# Patient Record
Sex: MALE | Race: OTHER | NOT HISPANIC OR LATINO | URBAN - METROPOLITAN AREA
[De-identification: names, ages, dates, MRNs, and addresses within clinical notes are randomized per-mention and may not be internally consistent; named-entity substitution may affect disease eponyms.]

---

## 2017-03-13 ENCOUNTER — EMERGENCY (EMERGENCY)
Facility: HOSPITAL | Age: 32
LOS: 1 days | Discharge: HOME | End: 2017-03-13
Attending: EMERGENCY MEDICINE | Admitting: EMERGENCY MEDICINE
Payer: COMMERCIAL

## 2017-03-13 VITALS
HEART RATE: 89 BPM | TEMPERATURE: 99 F | SYSTOLIC BLOOD PRESSURE: 133 MMHG | RESPIRATION RATE: 18 BRPM | DIASTOLIC BLOOD PRESSURE: 80 MMHG | OXYGEN SATURATION: 97 %

## 2017-03-13 VITALS
WEIGHT: 220.02 LBS | OXYGEN SATURATION: 97 % | DIASTOLIC BLOOD PRESSURE: 89 MMHG | HEIGHT: 66 IN | SYSTOLIC BLOOD PRESSURE: 140 MMHG | HEART RATE: 112 BPM | RESPIRATION RATE: 97 BRPM | TEMPERATURE: 99 F

## 2017-03-13 DIAGNOSIS — J02.9 ACUTE PHARYNGITIS, UNSPECIFIED: ICD-10-CM

## 2017-03-13 PROCEDURE — 99283 EMERGENCY DEPT VISIT LOW MDM: CPT

## 2017-03-13 RX ORDER — DEXAMETHASONE 0.5 MG/5ML
10 ELIXIR ORAL ONCE
Qty: 0 | Refills: 0 | Status: COMPLETED | OUTPATIENT
Start: 2017-03-13 | End: 2017-03-13

## 2017-03-13 RX ADMIN — Medication 300 MILLIGRAM(S): at 14:59

## 2017-03-13 RX ADMIN — Medication 10 MILLIGRAM(S): at 14:59

## 2017-03-13 NOTE — ED PROVIDER NOTE - CARE PLAN
Principal Discharge DX:	Uvulitis Principal Discharge DX:	Uvulitis  Instructions for follow-up, activity and diet:	Take clindamycin (antibiotic) as prescribed. Cold drinks for comfort. Follow-up with your primary doctor within 1-2 days. Return to an ER for any concerns.

## 2017-03-13 NOTE — ED ADULT NURSE NOTE - OBJECTIVE STATEMENT
32 y/o male  seen in [facility] c/o enlarged uvula since yesterday. Pt feels discomfort due to enlarged tonsil. No fever, cough, rash/hives, SOB.  No acute respiratory distress noted.

## 2017-03-13 NOTE — ED PROVIDER NOTE - MEDICAL DECISION MAKING DETAILS
31yM uvulitis without tonsillitis. Muffled voice but tolerating PO. Unlikely allergic. Likely viral but will treat with clindamycin and decadron, dc with close outpatient follow-up. Roxanne PGY3 31yM uvulitis without tonsillitis. Muffled voice but tolerating PO. Unlikely allergic. Likely viral but will treat with clindamycin and decadron, dc with close outpatient follow-up. Roxanne PGY3  Attending Statement: Agree with the above.  Uvulitis.  Protecting airway.  NSAIDs/decadron provided.  Clinda Rx'd.  ENT fu prn.  Strict return precautions provided.  --BMM

## 2017-03-13 NOTE — ED PROVIDER NOTE - OBJECTIVE STATEMENT
31yM no PMH, no known allergies presents with enlarged uvula since yesterday. Was out drinking rhe night before at a wedding and woke up with the enlarged uvula. Feels discomfort due to enlarged tonsil. No fever, cough, rash/hives, SOB. 31yM no PMH, no known allergies presents with enlarged uvula and sore throat since yesterday. Was out drinking rhe night before at a wedding and woke up with the enlarged uvula. Feels discomfort due to enlarged tonsil. No fever, cough, rash/hives, SOB.  Tolerating PO, no vomiting, no neck stiffness

## 2017-03-13 NOTE — ED PROVIDER NOTE - PLAN OF CARE
Take clindamycin (antibiotic) as prescribed. Cold drinks for comfort. Follow-up with your primary doctor within 1-2 days. Return to an ER for any concerns.

## 2019-01-29 ENCOUNTER — EMERGENCY (EMERGENCY)
Facility: HOSPITAL | Age: 34
LOS: 1 days | Discharge: HOME | End: 2019-01-29
Attending: EMERGENCY MEDICINE
Payer: COMMERCIAL

## 2019-01-29 VITALS
OXYGEN SATURATION: 100 % | RESPIRATION RATE: 18 BRPM | TEMPERATURE: 97 F | SYSTOLIC BLOOD PRESSURE: 152 MMHG | DIASTOLIC BLOOD PRESSURE: 95 MMHG | HEART RATE: 94 BPM

## 2019-01-29 VITALS
WEIGHT: 182.98 LBS | OXYGEN SATURATION: 100 % | HEART RATE: 98 BPM | HEIGHT: 68 IN | RESPIRATION RATE: 16 BRPM | SYSTOLIC BLOOD PRESSURE: 159 MMHG | TEMPERATURE: 98 F | DIASTOLIC BLOOD PRESSURE: 100 MMHG

## 2019-01-29 PROCEDURE — 73590 X-RAY EXAM OF LOWER LEG: CPT | Mod: 26,RT

## 2019-01-29 PROCEDURE — 73590 X-RAY EXAM OF LOWER LEG: CPT

## 2019-01-29 PROCEDURE — 99283 EMERGENCY DEPT VISIT LOW MDM: CPT | Mod: 25

## 2019-01-29 PROCEDURE — 90471 IMMUNIZATION ADMIN: CPT

## 2019-01-29 PROCEDURE — 90715 TDAP VACCINE 7 YRS/> IM: CPT

## 2019-01-29 PROCEDURE — 99284 EMERGENCY DEPT VISIT MOD MDM: CPT | Mod: 25

## 2019-01-29 RX ORDER — TETANUS TOXOID, REDUCED DIPHTHERIA TOXOID AND ACELLULAR PERTUSSIS VACCINE, ADSORBED 5; 2.5; 8; 8; 2.5 [IU]/.5ML; [IU]/.5ML; UG/.5ML; UG/.5ML; UG/.5ML
0.5 SUSPENSION INTRAMUSCULAR ONCE
Qty: 0 | Refills: 0 | Status: COMPLETED | OUTPATIENT
Start: 2019-01-29 | End: 2019-01-29

## 2019-01-29 RX ADMIN — TETANUS TOXOID, REDUCED DIPHTHERIA TOXOID AND ACELLULAR PERTUSSIS VACCINE, ADSORBED 0.5 MILLILITER(S): 5; 2.5; 8; 8; 2.5 SUSPENSION INTRAMUSCULAR at 02:05

## 2019-01-29 NOTE — ED ADULT NURSE NOTE - OBJECTIVE STATEMENT
33 YOM A&Ox3 came in for nail puncture of right shin. Patient stated was doing yard work when he fell into a wooden barrier, patient stated his leg hit a tesfaye nail. Patient stated pain is 3/10 and did not take any pain medication. Upon assessment small puncture wound noted to right shin, no active bleeding, discharge or inflammation noted. Patient denies pmh & psh. Patient appears to be in no acute distress. Safety & comfort maintained.

## 2019-01-29 NOTE — ED PROVIDER NOTE - OBJECTIVE STATEMENT
Patient is 33yM with no PMH no meds presenting with puncture wound to right shin tonight, tripped and fell on tesfaye nail that was sticking out of a board, only noticed wound after he went back inside, no trouble walking or pain moving foot distal to injury. No head trauma or LOC.   Tetanus unsure if UTD    PMD: CODIE Zamora  ROS: Denies fever, palpitations, chills, recent sickness, HA, vision changes, cough, SOB, chest pain, abdominal pain, n/v/d/c, dysuria, hematuria, rash, new joint aches, sick contacts, and recent travel.

## 2019-01-29 NOTE — ED PROVIDER NOTE - PHYSICAL EXAMINATION
Gen: NAD, AOx3  Head: NCAT  HEENT: PERRL, oral mucosa moist, normal conjunctiva  Lung: CTAB, no respiratory distress  CV: rrr, no murmurs, Normal perfusion  Abd: soft, NTND, no CVA tenderness  MSK: No edema, no visible deformities  Neuro: No focal neurologic deficits, RLE is neurovascularly intact  Skin: No rash, small puncture wound to anterior RLE nonbleeding  Psych: normal affect

## 2019-01-29 NOTE — ED PROVIDER NOTE - NSFOLLOWUPINSTRUCTIONS_ED_ALL_ED_FT
You were evaluated in the Emergency Department for a small puncture wound to the left leg.  You had the wound cleaned, had an xray taken (which appeared normal) and were given a tetanus vaccination.    Keep the wound clean and dry, apply neosporin or bacitracin each time you change the bandage.  Change the bandage at least twice daily for the next 1 week; change more frequently if/when the bandage becomes dirty.  Return immediately if you have worsening pain, redness around wound, pus draining from wound, fever, or other new/concerning symptoms.    Follow-up with your primary care doctor within the next 3-5 days.

## 2019-01-29 NOTE — ED PROVIDER NOTE - ATTENDING CONTRIBUTION TO CARE
32 y/o M with no PMH presenting with puncture wound to the rigth anterior shin, old nail in garden.  Minor wound on PE, Xray shows no FB, wound cleaned/irrigation with NS, and patient given adacell.  Stable for dc with wound care instructions, return precautions for signs of infection.

## 2019-11-04 ENCOUNTER — EMERGENCY (EMERGENCY)
Facility: HOSPITAL | Age: 34
LOS: 1 days | Discharge: HOME | End: 2019-11-04
Attending: EMERGENCY MEDICINE
Payer: COMMERCIAL

## 2019-11-04 VITALS
HEIGHT: 67 IN | TEMPERATURE: 99 F | SYSTOLIC BLOOD PRESSURE: 139 MMHG | WEIGHT: 184.97 LBS | OXYGEN SATURATION: 99 % | RESPIRATION RATE: 18 BRPM | DIASTOLIC BLOOD PRESSURE: 95 MMHG | HEART RATE: 100 BPM

## 2019-11-04 VITALS
TEMPERATURE: 99 F | DIASTOLIC BLOOD PRESSURE: 96 MMHG | RESPIRATION RATE: 18 BRPM | SYSTOLIC BLOOD PRESSURE: 155 MMHG | HEART RATE: 90 BPM | OXYGEN SATURATION: 97 %

## 2019-11-04 PROCEDURE — 99284 EMERGENCY DEPT VISIT MOD MDM: CPT | Mod: 25

## 2019-11-04 PROCEDURE — 71250 CT THORAX DX C-: CPT

## 2019-11-04 PROCEDURE — 99284 EMERGENCY DEPT VISIT MOD MDM: CPT

## 2019-11-04 PROCEDURE — 71250 CT THORAX DX C-: CPT | Mod: 26

## 2019-11-04 RX ORDER — IBUPROFEN 200 MG
600 TABLET ORAL ONCE
Refills: 0 | Status: COMPLETED | OUTPATIENT
Start: 2019-11-04 | End: 2019-11-04

## 2019-11-04 RX ORDER — LIDOCAINE 4 G/100G
1 CREAM TOPICAL ONCE
Refills: 0 | Status: COMPLETED | OUTPATIENT
Start: 2019-11-04 | End: 2019-11-04

## 2019-11-04 RX ORDER — ACETAMINOPHEN 500 MG
975 TABLET ORAL ONCE
Refills: 0 | Status: COMPLETED | OUTPATIENT
Start: 2019-11-04 | End: 2019-11-04

## 2019-11-04 RX ADMIN — LIDOCAINE 1 PATCH: 4 CREAM TOPICAL at 15:15

## 2019-11-04 RX ADMIN — Medication 600 MILLIGRAM(S): at 12:29

## 2019-11-04 RX ADMIN — Medication 975 MILLIGRAM(S): at 12:29

## 2019-11-04 NOTE — ED PROVIDER NOTE - OBJECTIVE STATEMENT
Pt. is a 33 y.o. M w/ no PMHx p/w left mid-back pain after falling down at work yesterday.  Pt. was working at a house and was backing up when he fell down into Pt. is a 33 y.o. M w/ no PMHx p/w left mid-back pain after falling down at work yesterday.  Pt. was working at a house and was backing up when he fell down into a hole yesterday at 5PM.  Pt. hit the mid back but no head trauma, LOC, or amnestic episodes.  Pt. is not on any blood thinners.  Pt. has pain in the area every time he takes a deep breath.  No headache, eye pain, CP, SOB, abdominal pain, n/v/d/c, fevers, chills, dysuria, hematuria, hematochezia, or melena.

## 2019-11-04 NOTE — ED PROVIDER NOTE - NSFOLLOWUPINSTRUCTIONS_ED_ALL_ED_FT
Your pain is due to a fracture.  Rib fractures heal on their own and the treatment is to manage your pain.  You can take tylenol 1000mg and motrin 400-600 mg every 6-8 hours as needed for your pain -- please refer to the pill bottle for further dosage and safety instructions.    If you start seeing significant black and blue around your abdomen or chest, have significant chest pain or shortness of breath, or any other concerning symptoms, please return immediately to the emergency department.    Please use the incentive spirometer as I discussed with you - 10 times an hour every hour.

## 2019-11-04 NOTE — ED PROVIDER NOTE - ATTENDING CONTRIBUTION TO CARE
32yo male no pmhx s/p fall yesterday presents with back pain. States he was doing work around the house and fell into a crawl space and fell onto his back.  Pain is left lateral back over his ribs. Pain is worse with cough, breathing. Denies any other injury to head, neck, no abd pain. No numbness, tingling or weakness. Pt took advil w some relief.   on exam, Patient is awake,alert,oriented x 3. Patient is well appearing and in no acute distress. Patient's chest is clear to ausculation, +s1s2. Abdomen is soft nd/nt +BS. Back no midline tenderness. L posterior ribs diffusely tender. no crepitus. Neuro intact. Extremity with no swelling or calf tenderness.   check ct  chest and pain control.

## 2019-11-04 NOTE — ED ADULT NURSE NOTE - NSIMPLEMENTINTERV_GEN_ALL_ED
Implemented All Fall Risk Interventions:  [place] to call system. Call bell, personal items and telephone within reach. Instruct patient to call for assistance. Room bathroom lighting operational. Non-slip footwear when patient is off stretcher. Physically safe environment: no spills, clutter or unnecessary equipment. Stretcher in lowest position, wheels locked, appropriate side rails in place. Provide visual cue, wrist band, yellow gown, etc. Monitor gait and stability. Monitor for mental status changes and reorient to person, place, and time. Review medications for side effects contributing to fall risk. Reinforce activity limits and safety measures with patient and family.

## 2019-11-04 NOTE — ED ADULT NURSE NOTE - OBJECTIVE STATEMENT
32 y/o male a+ox3, denies pmhx, coming from home for fall yesterday. Pt states he fell in a floor opening while walking backwards; legs went straight down while mid-back hit floor; pt denies hitting his head, no LOC no blood thinner use; ambulatory s/p injury. Pt came to ED for pain to left sided mis and upper back, worse with movement and deep breathing. Pt denies numbness or tingling, incontinence, chest pain or discomfort, headache, lightheadedness, dizziness, difficulty breathing, abdominal pain, n/v/d. Pt left in position of comfort, will reassess

## 2019-11-04 NOTE — ED PROVIDER NOTE - PATIENT PORTAL LINK FT
You can access the FollowMyHealth Patient Portal offered by [facility] by registering at the following website: [email or web address]. By joining [facility]’s FollowMyHealth portal, you will also be able to view your health information using other applications (apps) compatible with our system.

## 2019-11-04 NOTE — ED PROVIDER NOTE - PHYSICAL EXAMINATION
GENERAL: Patient awake alert NAD.  HEENT: NC/AT, Moist mucous membranes, PERRL, EOMI.  LUNGS: CTAB, no wheezes or crackles.   CARDIAC: RRR, no m/r/g.    ABDOMEN: Soft, NT, ND, No rebound, guarding. No CVA tenderness.   EXT: No edema. No calf tenderness. CV 2+DP/PT bilaterally.   MSK: No spinal tenderness, no pain with movement, no deformities.  NEURO: A&Ox3. Moving all extremities.  SKIN: Warm and dry. No rash.  PSYCH: Normal affect. GENERAL: Patient awake alert NAD.  HEENT: NC/AT, Moist mucous membranes, PERRL, EOMI.  LUNGS: CTAB, no wheezes or crackles.   CARDIAC: RRR, no m/r/g.    ABDOMEN: Soft, NT, ND, No rebound, guarding. No CVA tenderness.   EXT: No edema. No calf tenderness. CV 2+DP/PT bilaterally.   MSK: No spinal tenderness, no vertebral step-offs, pain w/ deep breath in the mid back, no deformities.  NEURO: A&Ox3. Moving all extremities.  SKIN: Warm and dry. No rash.  PSYCH: Normal affect.

## 2019-11-04 NOTE — ED PROVIDER NOTE - NS ED ROS FT
General: denies fever, chills, weight loss/weight gain.  HENT: denies nasal congestion, sore throat, rhinorrhea, ear pain  Eyes: denies visual changes, blurred vision, eye discharge, eye redness  Neck: denies neck pain, neck swelling  CV: denies chest pain, palpitations  Resp: denies difficulty breathing, cough  Abdominal: denies nausea, vomiting, diarrhea, abdominal pain, blood in stool, dark stool  MSK: +mid back pain; denies muscle aches, leg pain, leg swelling  Neuro: denies headaches, numbness, tingling, dizziness, lightheadedness.  Hematologic: denies unexplained bruises

## 2019-11-04 NOTE — ED PROVIDER NOTE - CLINICAL SUMMARY MEDICAL DECISION MAKING FREE TEXT BOX
see attending attestation Pt. is a 33 y.o. M p/w mid back pain s/p mechanical fall into a hole.  Concerned for multiple rib fractures given extent of pain.  No internal organ injury suspected, but will get CT chest to r/o.  Will manage pain.  If only a rib fracture, will d/c w/ incentive spirometer.

## 2020-04-26 ENCOUNTER — MESSAGE (OUTPATIENT)
Age: 35
End: 2020-04-26

## 2020-05-04 ENCOUNTER — APPOINTMENT (OUTPATIENT)
Dept: DISASTER EMERGENCY | Facility: CLINIC | Age: 35
End: 2020-05-04

## 2020-05-05 LAB
SARS-COV-2 IGG SERPL IA-ACNC: <0.1 INDEX
SARS-COV-2 IGG SERPL QL IA: NEGATIVE

## 2020-10-15 NOTE — ED ADULT TRIAGE NOTE - HEIGHT IN CM
Airway patent, Nasal mucosa clear. Mouth with normal mucosa. Throat has no vesicles, no oropharyngeal exudates and uvula is midline.
170.18

## 2023-12-01 NOTE — ED PROVIDER NOTE - NS ED ATTENDING STATEMENT MOD
I have personally seen and examined this patient. I have fully participated in the care of this patient. I have reviewed all pertinent clinical information, including history physical exam, plan and the Resident's note and agree except as noted
Clear/Diminished

## 2024-10-27 ENCOUNTER — INPATIENT (INPATIENT)
Facility: HOSPITAL | Age: 39
LOS: 8 days | Discharge: HOME | DRG: 439 | End: 2024-11-05
Attending: SURGERY | Admitting: SURGERY
Payer: COMMERCIAL

## 2024-10-27 VITALS
OXYGEN SATURATION: 98 % | HEART RATE: 145 BPM | TEMPERATURE: 98 F | WEIGHT: 169.98 LBS | RESPIRATION RATE: 22 BRPM | HEIGHT: 67 IN

## 2024-10-27 LAB
ADD ON TEST-SPECIMEN IN LAB: SIGNIFICANT CHANGE UP
ALBUMIN SERPL ELPH-MCNC: 3.7 G/DL — SIGNIFICANT CHANGE UP (ref 3.3–5)
ALP SERPL-CCNC: 172 U/L — HIGH (ref 40–120)
ALT FLD-CCNC: 256 U/L — HIGH (ref 10–45)
ANION GAP SERPL CALC-SCNC: 26 MMOL/L — HIGH (ref 5–17)
AST SERPL-CCNC: 680 U/L — HIGH (ref 10–40)
BASOPHILS # BLD AUTO: 0 K/UL — SIGNIFICANT CHANGE UP (ref 0–0.2)
BASOPHILS NFR BLD AUTO: 0 % — SIGNIFICANT CHANGE UP (ref 0–2)
BILIRUB SERPL-MCNC: 2.4 MG/DL — HIGH (ref 0.2–1.2)
BUN SERPL-MCNC: 12 MG/DL — SIGNIFICANT CHANGE UP (ref 7–23)
CALCIUM SERPL-MCNC: 7.6 MG/DL — LOW (ref 8.4–10.5)
CHLORIDE SERPL-SCNC: 92 MMOL/L — LOW (ref 96–108)
CO2 SERPL-SCNC: 11 MMOL/L — LOW (ref 22–31)
CREAT SERPL-MCNC: 1.17 MG/DL — SIGNIFICANT CHANGE UP (ref 0.5–1.3)
DACRYOCYTES BLD QL SMEAR: SLIGHT — SIGNIFICANT CHANGE UP
EGFR: 82 ML/MIN/1.73M2 — SIGNIFICANT CHANGE UP
EOSINOPHIL # BLD AUTO: 0 K/UL — SIGNIFICANT CHANGE UP (ref 0–0.5)
EOSINOPHIL NFR BLD AUTO: 0 % — SIGNIFICANT CHANGE UP (ref 0–6)
GAS PNL BLDV: SIGNIFICANT CHANGE UP
GAS PNL BLDV: SIGNIFICANT CHANGE UP
GLUCOSE SERPL-MCNC: 117 MG/DL — HIGH (ref 70–99)
HCG SERPL-ACNC: <2 MIU/ML — HIGH
HCT VFR BLD CALC: 49.1 % — SIGNIFICANT CHANGE UP (ref 39–50)
HGB BLD-MCNC: 16.4 G/DL — SIGNIFICANT CHANGE UP (ref 13–17)
LIDOCAIN IGE QN: 1122 U/L — HIGH (ref 7–60)
LYMPHOCYTES # BLD AUTO: 0.65 K/UL — LOW (ref 1–3.3)
LYMPHOCYTES # BLD AUTO: 6 % — LOW (ref 13–44)
MANUAL SMEAR VERIFICATION: SIGNIFICANT CHANGE UP
MCHC RBC-ENTMCNC: 27.2 PG — SIGNIFICANT CHANGE UP (ref 27–34)
MCHC RBC-ENTMCNC: 33.4 GM/DL — SIGNIFICANT CHANGE UP (ref 32–36)
MCV RBC AUTO: 81.6 FL — SIGNIFICANT CHANGE UP (ref 80–100)
METAMYELOCYTES # FLD: 8.6 % — HIGH (ref 0–0)
MONOCYTES # BLD AUTO: 0.28 K/UL — SIGNIFICANT CHANGE UP (ref 0–0.9)
MONOCYTES NFR BLD AUTO: 2.6 % — SIGNIFICANT CHANGE UP (ref 2–14)
NEUTROPHILS # BLD AUTO: 8.94 K/UL — HIGH (ref 1.8–7.4)
NEUTROPHILS NFR BLD AUTO: 60.4 % — SIGNIFICANT CHANGE UP (ref 43–77)
NEUTS BAND # BLD: 22.4 % — HIGH (ref 0–8)
OVALOCYTES BLD QL SMEAR: SLIGHT — SIGNIFICANT CHANGE UP
PLAT MORPH BLD: NORMAL — SIGNIFICANT CHANGE UP
PLATELET # BLD AUTO: 195 K/UL — SIGNIFICANT CHANGE UP (ref 150–400)
POIKILOCYTOSIS BLD QL AUTO: SIGNIFICANT CHANGE UP
POTASSIUM SERPL-MCNC: 3.9 MMOL/L — SIGNIFICANT CHANGE UP (ref 3.5–5.3)
POTASSIUM SERPL-SCNC: 3.9 MMOL/L — SIGNIFICANT CHANGE UP (ref 3.5–5.3)
PROT SERPL-MCNC: 7.1 G/DL — SIGNIFICANT CHANGE UP (ref 6–8.3)
RBC # BLD: 6.02 M/UL — HIGH (ref 4.2–5.8)
RBC # FLD: 18.6 % — HIGH (ref 10.3–14.5)
RBC BLD AUTO: ABNORMAL
SODIUM SERPL-SCNC: 129 MMOL/L — LOW (ref 135–145)
WBC # BLD: 10.8 K/UL — HIGH (ref 3.8–10.5)
WBC # FLD AUTO: 10.8 K/UL — HIGH (ref 3.8–10.5)

## 2024-10-27 PROCEDURE — 71275 CT ANGIOGRAPHY CHEST: CPT | Mod: 26,MC

## 2024-10-27 PROCEDURE — 99291 CRITICAL CARE FIRST HOUR: CPT

## 2024-10-27 PROCEDURE — 74174 CTA ABD&PLVS W/CONTRAST: CPT | Mod: 26,MC

## 2024-10-27 RX ORDER — ONDANSETRON HYDROCHLORIDE 2 MG/ML
4 INJECTION, SOLUTION INTRAMUSCULAR; INTRAVENOUS ONCE
Refills: 0 | Status: COMPLETED | OUTPATIENT
Start: 2024-10-27 | End: 2024-10-27

## 2024-10-27 RX ORDER — SODIUM CHLORIDE 9 MG/ML
500 INJECTION, SOLUTION INTRAMUSCULAR; INTRAVENOUS; SUBCUTANEOUS ONCE
Refills: 0 | Status: COMPLETED | OUTPATIENT
Start: 2024-10-27 | End: 2024-10-27

## 2024-10-27 RX ORDER — PIPERACILLIN AND TAZOBACTAM .5; 4 G/20ML; G/20ML
3.38 INJECTION, POWDER, LYOPHILIZED, FOR SOLUTION INTRAVENOUS ONCE
Refills: 0 | Status: COMPLETED | OUTPATIENT
Start: 2024-10-27 | End: 2024-10-27

## 2024-10-27 RX ORDER — MORPHINE SULFATE 30 MG/1
4 TABLET, EXTENDED RELEASE ORAL ONCE
Refills: 0 | Status: DISCONTINUED | OUTPATIENT
Start: 2024-10-27 | End: 2024-10-27

## 2024-10-27 RX ORDER — SODIUM CHLORIDE 9 MG/ML
1000 INJECTION, SOLUTION INTRAMUSCULAR; INTRAVENOUS; SUBCUTANEOUS ONCE
Refills: 0 | Status: COMPLETED | OUTPATIENT
Start: 2024-10-27 | End: 2024-10-27

## 2024-10-27 RX ORDER — ACETAMINOPHEN 500 MG
1000 TABLET ORAL ONCE
Refills: 0 | Status: COMPLETED | OUTPATIENT
Start: 2024-10-27 | End: 2024-10-27

## 2024-10-27 RX ADMIN — SODIUM CHLORIDE 500 MILLILITER(S): 9 INJECTION, SOLUTION INTRAMUSCULAR; INTRAVENOUS; SUBCUTANEOUS at 23:04

## 2024-10-27 RX ADMIN — Medication 400 MILLIGRAM(S): at 22:40

## 2024-10-27 RX ADMIN — SODIUM CHLORIDE 1000 MILLILITER(S): 9 INJECTION, SOLUTION INTRAMUSCULAR; INTRAVENOUS; SUBCUTANEOUS at 21:49

## 2024-10-27 RX ADMIN — SODIUM CHLORIDE 1000 MILLILITER(S): 9 INJECTION, SOLUTION INTRAMUSCULAR; INTRAVENOUS; SUBCUTANEOUS at 22:41

## 2024-10-27 RX ADMIN — ONDANSETRON HYDROCHLORIDE 4 MILLIGRAM(S): 2 INJECTION, SOLUTION INTRAMUSCULAR; INTRAVENOUS at 21:49

## 2024-10-27 RX ADMIN — PIPERACILLIN AND TAZOBACTAM 200 GRAM(S): .5; 4 INJECTION, POWDER, LYOPHILIZED, FOR SOLUTION INTRAVENOUS at 23:04

## 2024-10-27 RX ADMIN — MORPHINE SULFATE 4 MILLIGRAM(S): 30 TABLET, EXTENDED RELEASE ORAL at 21:51

## 2024-10-27 NOTE — ED ADULT NURSE NOTE - NSFALLUNIVINTERV_ED_ALL_ED
Bed/Stretcher in lowest position, wheels locked, appropriate side rails in place/Call bell, personal items and telephone in reach/Instruct patient to call for assistance before getting out of bed/chair/stretcher/Non-slip footwear applied when patient is off stretcher/[place] to call system/Physically safe environment - no spills, clutter or unnecessary equipment/Purposeful proactive rounding/Room/bathroom lighting operational, light cord in reach

## 2024-10-27 NOTE — ED ADULT NURSE NOTE - NS ED NURSE PATIENT LEFT UNIT TIME
02:54 Tazorac Counseling:  Patient advised that medication is irritating and drying.  Patient may need to apply sparingly and wash off after an hour before eventually leaving it on overnight.  The patient verbalized understanding of the proper use and possible adverse effects of tazorac.  All of the patient's questions and concerns were addressed. 04:00

## 2024-10-27 NOTE — ED ADULT NURSE NOTE - CHIEF COMPLAINT QUOTE
right sided abdominal pain today w/ vomiting- diaphoretic  unable to obtain bp due to excessive movement from pain

## 2024-10-27 NOTE — ED ADULT TRIAGE NOTE - CHIEF COMPLAINT QUOTE
right sided abdominal pain today w/ vomiting right sided abdominal pain today w/ vomiting- diaphoretic  unable to obtain bp due to excessive movement from pain

## 2024-10-27 NOTE — ED ADULT NURSE NOTE - OBJECTIVE STATEMENT
39 yo M AOx4 from home with no pertinent PMH and no PSH c/o epigastric abd pain. Pt reports onset of symptoms started when waking up. Pt reports burning like abd pain. Pt reports nausea and dry heaving, denies vomiting. Pt reports last BM was in the am. EKG done in triage. Pt placed on CM- sinus tachycardia 120 bpm. MD Archibald at bedside. Pt rectal temperature of 100.5F, MD Archibald aware. Pt initially presents to [facility] ED in no acute distress with unlabored breathing. Pt reports abdomen is more bloated than usual. Call bell in reach. Stretcher in lowest position locked with blanket given. Comfort care and safety measures provided. Pt denies c/p, sob, V/D, fevers, chills, headache, dizziness, dysuria, blood in urine and stool. 39 yo M AOx4 from home with no pertinent PMH and no PSH c/o epigastric abd pain. Pt reports onset of symptoms started when waking up. Pt reports burning like abd pain. Pt reports nausea and dry heaving, denies vomiting. Pt reports last BM was in the am. EKG done in triage. Pt placed on CM- sinus tachycardia 120 bpm. MD Archibald at bedside. Pt rectal temperature of 100.5F, MD Archibald aware. Pt reports he drinks alcohol socially. Pt repots he drinks 2 hard seltzers a day. Pt reports he was drinking a couple beers with his friend yesterday, Pt initially presents to [facility] ED in no acute distress with unlabored breathing. Pt reports abdomen is more bloated than usual. Call bell in reach. Stretcher in lowest position locked with blanket given. Comfort care and safety measures provided. Pt denies c/p, sob, V/D, fevers, chills, headache, dizziness, dysuria, blood in urine and stool.

## 2024-10-28 DIAGNOSIS — N17.9 ACUTE KIDNEY FAILURE, UNSPECIFIED: ICD-10-CM

## 2024-10-28 DIAGNOSIS — Z29.9 ENCOUNTER FOR PROPHYLACTIC MEASURES, UNSPECIFIED: ICD-10-CM

## 2024-10-28 DIAGNOSIS — F10.20 ALCOHOL DEPENDENCE, UNCOMPLICATED: ICD-10-CM

## 2024-10-28 DIAGNOSIS — K85.90 ACUTE PANCREATITIS WITHOUT NECROSIS OR INFECTION, UNSPECIFIED: ICD-10-CM

## 2024-10-28 DIAGNOSIS — E87.1 HYPO-OSMOLALITY AND HYPONATREMIA: ICD-10-CM

## 2024-10-28 LAB
ALBUMIN SERPL ELPH-MCNC: 3 G/DL — LOW (ref 3.3–5)
ALP SERPL-CCNC: 120 U/L — SIGNIFICANT CHANGE UP (ref 40–120)
ALT FLD-CCNC: 183 U/L — HIGH (ref 10–45)
ANION GAP SERPL CALC-SCNC: 19 MMOL/L — HIGH (ref 5–17)
ANION GAP SERPL CALC-SCNC: 19 MMOL/L — HIGH (ref 5–17)
APTT BLD: 30.2 SEC — SIGNIFICANT CHANGE UP (ref 24.5–35.6)
AST SERPL-CCNC: 456 U/L — HIGH (ref 10–40)
BILIRUB SERPL-MCNC: 2.7 MG/DL — HIGH (ref 0.2–1.2)
BUN SERPL-MCNC: 13 MG/DL — SIGNIFICANT CHANGE UP (ref 7–23)
BUN SERPL-MCNC: 15 MG/DL — SIGNIFICANT CHANGE UP (ref 7–23)
CA-I BLD-SCNC: 0.96 MMOL/L — LOW (ref 1.15–1.33)
CALCIUM SERPL-MCNC: 7 MG/DL — LOW (ref 8.4–10.5)
CALCIUM SERPL-MCNC: 7.3 MG/DL — LOW (ref 8.4–10.5)
CHLORIDE SERPL-SCNC: 98 MMOL/L — SIGNIFICANT CHANGE UP (ref 96–108)
CHLORIDE SERPL-SCNC: 99 MMOL/L — SIGNIFICANT CHANGE UP (ref 96–108)
CO2 SERPL-SCNC: 13 MMOL/L — LOW (ref 22–31)
CO2 SERPL-SCNC: 14 MMOL/L — LOW (ref 22–31)
CREAT SERPL-MCNC: 1.31 MG/DL — HIGH (ref 0.5–1.3)
CREAT SERPL-MCNC: 1.42 MG/DL — HIGH (ref 0.5–1.3)
EGFR: 65 ML/MIN/1.73M2 — SIGNIFICANT CHANGE UP
EGFR: 71 ML/MIN/1.73M2 — SIGNIFICANT CHANGE UP
ETHANOL SERPL-MCNC: <10 MG/DL — SIGNIFICANT CHANGE UP (ref 0–10)
GAS PNL BLDV: SIGNIFICANT CHANGE UP
GLUCOSE SERPL-MCNC: 123 MG/DL — HIGH (ref 70–99)
GLUCOSE SERPL-MCNC: 140 MG/DL — HIGH (ref 70–99)
HCT VFR BLD CALC: 47.1 % — SIGNIFICANT CHANGE UP (ref 39–50)
HGB BLD-MCNC: 15.8 G/DL — SIGNIFICANT CHANGE UP (ref 13–17)
INR BLD: 1.03 RATIO — SIGNIFICANT CHANGE UP (ref 0.85–1.16)
MAGNESIUM SERPL-MCNC: 1 MG/DL — CRITICAL LOW (ref 1.6–2.6)
MCHC RBC-ENTMCNC: 27.4 PG — SIGNIFICANT CHANGE UP (ref 27–34)
MCHC RBC-ENTMCNC: 33.5 GM/DL — SIGNIFICANT CHANGE UP (ref 32–36)
MCV RBC AUTO: 81.6 FL — SIGNIFICANT CHANGE UP (ref 80–100)
NRBC # BLD: 0 /100 WBCS — SIGNIFICANT CHANGE UP (ref 0–0)
PHOSPHATE SERPL-MCNC: 3.7 MG/DL — SIGNIFICANT CHANGE UP (ref 2.5–4.5)
PLATELET # BLD AUTO: 144 K/UL — LOW (ref 150–400)
POTASSIUM SERPL-MCNC: 4.5 MMOL/L — SIGNIFICANT CHANGE UP (ref 3.5–5.3)
POTASSIUM SERPL-MCNC: 5.6 MMOL/L — HIGH (ref 3.5–5.3)
POTASSIUM SERPL-SCNC: 4.5 MMOL/L — SIGNIFICANT CHANGE UP (ref 3.5–5.3)
POTASSIUM SERPL-SCNC: 5.6 MMOL/L — HIGH (ref 3.5–5.3)
PROT SERPL-MCNC: 5.8 G/DL — LOW (ref 6–8.3)
PROTHROM AB SERPL-ACNC: 11.7 SEC — SIGNIFICANT CHANGE UP (ref 9.9–13.4)
RBC # BLD: 5.77 M/UL — SIGNIFICANT CHANGE UP (ref 4.2–5.8)
RBC # FLD: 18.4 % — HIGH (ref 10.3–14.5)
SODIUM SERPL-SCNC: 130 MMOL/L — LOW (ref 135–145)
SODIUM SERPL-SCNC: 132 MMOL/L — LOW (ref 135–145)
WBC # BLD: 9.81 K/UL — SIGNIFICANT CHANGE UP (ref 3.8–10.5)
WBC # FLD AUTO: 9.81 K/UL — SIGNIFICANT CHANGE UP (ref 3.8–10.5)

## 2024-10-28 PROCEDURE — 93010 ELECTROCARDIOGRAM REPORT: CPT

## 2024-10-28 PROCEDURE — 76705 ECHO EXAM OF ABDOMEN: CPT | Mod: 26

## 2024-10-28 PROCEDURE — 99223 1ST HOSP IP/OBS HIGH 75: CPT

## 2024-10-28 RX ORDER — ACETAMINOPHEN 500 MG
1000 TABLET ORAL EVERY 6 HOURS
Refills: 0 | Status: DISCONTINUED | OUTPATIENT
Start: 2024-10-28 | End: 2024-10-28

## 2024-10-28 RX ORDER — ACETAMINOPHEN 500 MG
650 TABLET ORAL EVERY 6 HOURS
Refills: 0 | Status: DISCONTINUED | OUTPATIENT
Start: 2024-10-28 | End: 2024-11-05

## 2024-10-28 RX ORDER — PHENOBARBITAL 15 MG
TABLET ORAL
Refills: 0 | Status: COMPLETED | OUTPATIENT
Start: 2024-10-28 | End: 2024-11-01

## 2024-10-28 RX ORDER — OXYCODONE HYDROCHLORIDE 30 MG/1
5 TABLET ORAL EVERY 6 HOURS
Refills: 0 | Status: DISCONTINUED | OUTPATIENT
Start: 2024-10-28 | End: 2024-11-01

## 2024-10-28 RX ORDER — HYDROMORPHONE HCL/0.9% NACL/PF 6 MG/30 ML
0.25 PATIENT CONTROLLED ANALGESIA SYRINGE INTRAVENOUS EVERY 6 HOURS
Refills: 0 | Status: DISCONTINUED | OUTPATIENT
Start: 2024-10-28 | End: 2024-10-28

## 2024-10-28 RX ORDER — FOLIC ACID 1 MG/1
1 TABLET ORAL ONCE
Refills: 0 | Status: COMPLETED | OUTPATIENT
Start: 2024-10-28 | End: 2024-10-28

## 2024-10-28 RX ORDER — CHLORDIAZEPOXIDE HCL 10 MG
50 CAPSULE ORAL ONCE
Refills: 0 | Status: DISCONTINUED | OUTPATIENT
Start: 2024-10-28 | End: 2024-10-28

## 2024-10-28 RX ORDER — THIAMINE HCL 100 MG
100 TABLET ORAL ONCE
Refills: 0 | Status: COMPLETED | OUTPATIENT
Start: 2024-10-28 | End: 2024-10-28

## 2024-10-28 RX ORDER — PHENOBARBITAL 15 MG
64.8 TABLET ORAL EVERY 8 HOURS
Refills: 0 | Status: DISCONTINUED | OUTPATIENT
Start: 2024-10-28 | End: 2024-10-30

## 2024-10-28 RX ORDER — PHENOBARBITAL 15 MG
64.8 TABLET ORAL EVERY 12 HOURS
Refills: 0 | Status: DISCONTINUED | OUTPATIENT
Start: 2024-10-31 | End: 2024-10-31

## 2024-10-28 RX ORDER — CALCIUM GLUCONATE 98 MG/ML
2 INJECTION, SOLUTION INTRAVENOUS
Refills: 0 | Status: COMPLETED | OUTPATIENT
Start: 2024-10-28 | End: 2024-10-28

## 2024-10-28 RX ORDER — SODIUM CHLORIDE 9 MG/ML
1000 INJECTION, SOLUTION INTRAMUSCULAR; INTRAVENOUS; SUBCUTANEOUS
Refills: 0 | Status: DISCONTINUED | OUTPATIENT
Start: 2024-10-28 | End: 2024-10-28

## 2024-10-28 RX ORDER — DIAZEPAM 10 MG/1
5 FILM BUCCAL ONCE
Refills: 0 | Status: DISCONTINUED | OUTPATIENT
Start: 2024-10-28 | End: 2024-10-28

## 2024-10-28 RX ORDER — ONDANSETRON HYDROCHLORIDE 2 MG/ML
4 INJECTION, SOLUTION INTRAMUSCULAR; INTRAVENOUS ONCE
Refills: 0 | Status: COMPLETED | OUTPATIENT
Start: 2024-10-28 | End: 2024-10-28

## 2024-10-28 RX ORDER — OXYCODONE HYDROCHLORIDE 30 MG/1
10 TABLET ORAL EVERY 6 HOURS
Refills: 0 | Status: DISCONTINUED | OUTPATIENT
Start: 2024-10-28 | End: 2024-11-04

## 2024-10-28 RX ORDER — ENOXAPARIN SODIUM 40MG/0.4ML
40 SYRINGE (ML) SUBCUTANEOUS EVERY 24 HOURS
Refills: 0 | Status: DISCONTINUED | OUTPATIENT
Start: 2024-10-28 | End: 2024-11-01

## 2024-10-28 RX ORDER — MAGNESIUM SULFATE IN 0.9% NACL 2 G/50 ML
2 INTRAVENOUS SOLUTION, PIGGYBACK (ML) INTRAVENOUS ONCE
Refills: 0 | Status: COMPLETED | OUTPATIENT
Start: 2024-10-28 | End: 2024-10-28

## 2024-10-28 RX ORDER — PHENOBARBITAL 15 MG
162 TABLET ORAL ONCE
Refills: 0 | Status: DISCONTINUED | OUTPATIENT
Start: 2024-10-28 | End: 2024-10-28

## 2024-10-28 RX ORDER — HYDROMORPHONE HCL/0.9% NACL/PF 6 MG/30 ML
0.5 PATIENT CONTROLLED ANALGESIA SYRINGE INTRAVENOUS EVERY 6 HOURS
Refills: 0 | Status: DISCONTINUED | OUTPATIENT
Start: 2024-10-28 | End: 2024-10-28

## 2024-10-28 RX ORDER — INFLUENZ VIR VAC TV P-SURF2003 15MCG/.5ML
0.5 SYRINGE (ML) INTRAMUSCULAR ONCE
Refills: 0 | Status: DISCONTINUED | OUTPATIENT
Start: 2024-10-28 | End: 2024-11-05

## 2024-10-28 RX ORDER — PHENOBARBITAL 15 MG
64.8 TABLET ORAL EVERY 24 HOURS
Refills: 0 | Status: DISCONTINUED | OUTPATIENT
Start: 2024-11-01 | End: 2024-11-01

## 2024-10-28 RX ORDER — PHENOBARBITAL 15 MG
160 TABLET ORAL ONCE
Refills: 0 | Status: DISCONTINUED | OUTPATIENT
Start: 2024-10-28 | End: 2024-10-28

## 2024-10-28 RX ADMIN — Medication 50 MILLIGRAM(S): at 00:25

## 2024-10-28 RX ADMIN — FOLIC ACID 1 MILLIGRAM(S): 1 TABLET ORAL at 09:29

## 2024-10-28 RX ADMIN — Medication 120 MILLILITER(S): at 02:39

## 2024-10-28 RX ADMIN — Medication 650 MILLIGRAM(S): at 19:19

## 2024-10-28 RX ADMIN — OXYCODONE HYDROCHLORIDE 10 MILLIGRAM(S): 30 TABLET ORAL at 16:04

## 2024-10-28 RX ADMIN — Medication 400 MILLIGRAM(S): at 09:31

## 2024-10-28 RX ADMIN — Medication 0.5 MILLIGRAM(S): at 08:00

## 2024-10-28 RX ADMIN — Medication 0.25 MILLIGRAM(S): at 12:34

## 2024-10-28 RX ADMIN — Medication 64.8 MILLIGRAM(S): at 21:52

## 2024-10-28 RX ADMIN — Medication 1000 MILLILITER(S): at 00:25

## 2024-10-28 RX ADMIN — OXYCODONE HYDROCHLORIDE 10 MILLIGRAM(S): 30 TABLET ORAL at 23:09

## 2024-10-28 RX ADMIN — OXYCODONE HYDROCHLORIDE 10 MILLIGRAM(S): 30 TABLET ORAL at 22:39

## 2024-10-28 RX ADMIN — Medication 0.5 MILLIGRAM(S): at 08:30

## 2024-10-28 RX ADMIN — Medication 162 MILLIGRAM(S): at 09:27

## 2024-10-28 RX ADMIN — Medication 400 MILLIGRAM(S): at 04:16

## 2024-10-28 RX ADMIN — ONDANSETRON HYDROCHLORIDE 4 MILLIGRAM(S): 2 INJECTION, SOLUTION INTRAMUSCULAR; INTRAVENOUS at 02:42

## 2024-10-28 RX ADMIN — Medication 650 MILLIGRAM(S): at 18:42

## 2024-10-28 RX ADMIN — ONDANSETRON HYDROCHLORIDE 4 MILLIGRAM(S): 2 INJECTION, SOLUTION INTRAMUSCULAR; INTRAVENOUS at 18:42

## 2024-10-28 RX ADMIN — CALCIUM GLUCONATE 200 GRAM(S): 98 INJECTION, SOLUTION INTRAVENOUS at 05:09

## 2024-10-28 RX ADMIN — Medication 40 MILLIGRAM(S): at 12:34

## 2024-10-28 RX ADMIN — Medication 0.5 MILLIGRAM(S): at 02:42

## 2024-10-28 RX ADMIN — CALCIUM GLUCONATE 200 GRAM(S): 98 INJECTION, SOLUTION INTRAVENOUS at 03:45

## 2024-10-28 RX ADMIN — Medication 100 MILLIGRAM(S): at 09:30

## 2024-10-28 RX ADMIN — DIAZEPAM 5 MILLIGRAM(S): 10 FILM BUCCAL at 00:25

## 2024-10-28 RX ADMIN — OXYCODONE HYDROCHLORIDE 10 MILLIGRAM(S): 30 TABLET ORAL at 17:00

## 2024-10-28 RX ADMIN — Medication 25 GRAM(S): at 09:31

## 2024-10-28 NOTE — CONSULT NOTE ADULT - PROBLEM SELECTOR RECOMMENDATION 4
5 drinks every other day and willing to cut down  no hx of etoh withdrawal per pt  - SW consult 5 drinks every other day and willing to cut down  no hx of etoh withdrawal per pt  Hepatic steatosis likely due to etoh use  - SW consult  - advise limiting etoh use

## 2024-10-28 NOTE — CONSULT NOTE ADULT - PROBLEM SELECTOR RECOMMENDATION 2
suspect prerenal vs postobstructive. pt denies hx of CKD  - IVF as above  - bladder scan for PVR  - urine lytes pending, UA pending

## 2024-10-28 NOTE — PATIENT PROFILE ADULT - FUNCTIONAL ASSESSMENT - DAILY ACTIVITY 2.
Reason for call / Patient's question: Jenny from OT at [facility] called about patient seeing Dandapat on 5/1/24 and wanted to inform that pt needs to see  as well for eye exam ( [facility]). She felt it was necessary to bring up once she sees Dandapat.    Call back number: na    Fax # (if applicable): na    Pharmacy (if applicable): na    Does patient require a call back from nurse: No     4 = No assist / stand by assistance

## 2024-10-28 NOTE — ED PROVIDER NOTE - PHYSICAL EXAMINATION
General: alert, oriented to person, time, place  Psych: mood appropriate  Head: normocephalic; atraumatic  Eyes: conjunctivae clear bilaterally, sclerae anicteric  ENT: no nasal flaring, patent nares  Cardio: RRR, no m/r/g, pulses 2+ b/l  Resp: CATB, no w/r/r  GI: epigastric tenderness to palpation  Neuro: normal sensation, moving all four extremities equally  Skin: No evidence of rash or bruising  MSK: normal movement of all extremities  Lymph/Vasc: no LE edema

## 2024-10-28 NOTE — H&P ADULT - ATTENDING COMMENTS
seen and examined 10-28-24 @ 0150    had 5 drinks on Saturday night  acute onset epigastric pain Sunday morning    soft / moderate epigastric tenderness / mildly distended / non-tympanitic  no surgical scars on abdomen  no jaundice    WBC = 10  plats - 195  Ca - 7.6  T bili - 2.4  alk phos - 172  AST - 680  ALT - 256  lipase - 1122    lactate - 5.2 -> 3.3      CTA abd/pelvis - interstitial edematous pancreatitis. gallbladder sludge. hepatic steatosis. (I reviewed the radiologic images)    RUQ U/S - gallbladder sludge. CBD = 4 mm.      acute pancreatitis secondary to biliary sludge and alcohol  -consult GI for ERCP if bilirubin remains elevated  -I recommended cholecystectomy prior to discharge to decrease the risk of recurrent acute pancreatitis.  -I also recommended complete abstinence from alcohol to decrease the risk of recurrent acute pancreatitis.    hypocalcemia secondary ot acute pancreatitis  -IV repletion seen and examined 10-28-24 @ 0150    had 5 drinks on Saturday night  acute onset epigastric pain Sunday morning    soft / moderate epigastric tenderness / mildly distended / non-tympanitic  no surgical scars on abdomen  no jaundice    WBC = 10  plats - 195  Ca - 7.6  T bili - 2.4  alk phos - 172  AST - 680  ALT - 256  lipase - 1122    lactate - 5.2 -> 3.3      CTA abd/pelvis - interstitial edematous pancreatitis. gallbladder sludge. hepatic steatosis. (I reviewed the radiologic images)    RUQ U/S - gallbladder sludge. CBD = 4 mm.      acute pancreatitis secondary to biliary sludge and alcohol  -consult GI for ERCP if bilirubin remains elevated  -I recommended cholecystectomy prior to discharge to decrease the risk of recurrent acute pancreatitis.  -I also recommended complete abstinence from alcohol to decrease the risk of recurrent acute pancreatitis.    lactic acidosis  -improving with IV fluids  -continue LR @ 120 mL/hr    hypocalcemia secondary ot acute pancreatitis  -IV repletion

## 2024-10-28 NOTE — PATIENT PROFILE ADULT - FALL HARM RISK - HARM RISK INTERVENTIONS

## 2024-10-28 NOTE — H&P ADULT - HISTORY OF PRESENT ILLNESS
HPI: 38 y M with no significant pmh, presenting with acute onset abdominal pain that woke him up this morning, No associated nausea, vomiting, fevers or chills.     In the ED, patient was afebrile, VSS, WBC 10. Na 129, T bili 2.4, Alk phos 172, , , lipase 1122, lactate 5.2. CT showed gallbladder sludge, normal CBD, acute interstitial edematous pancreatitis.      PMH/PSH:     MEDS:    ALLERGIES: NKDA    REVIEW OF SYSTEMS: All ROS negative except as per HPI.  ____________________________________________    VITALS:T(C): 36.8, Max: 38.1 (10-27)  T(F): 98.2, Max: 100.5 (10-27)  HR: 112 (108 - 145)  BP: 134/106 (112/92 - 134/106)  BP(mean): --  RR: 19 (17 - 22)  SpO2: 98% (96% - 100%) room air         PHYSICAL EXAM:  General: AAOx3, no acute distress.  Respiratory: breathing comfortably, no increased WOB   Abdomen: soft, distended, epigastric TTP, no rebound, no guarding  Extremities: Moves all four.   ____________________________________________    LABS:                      16.4  10.80 )-----------( 195    ( 27 Oct 2024 21:46 )             49.1  129[L]  |  92[L]  |  12  ----------------------------<  117[H]    (10-27)  3.9   |  11[L]  |  1.17          Ca    7.6[L]      10-27  Mg    x  Phos  x        LIVER FUNCTIONS - ( 27 Oct 2024 21:46 )  Alb: 3.7 g/dL / Pro: 7.1 g/dL / ALK PHOS: 172 U/L / ALT: 256 U/L / AST: 680 U/L / GGT: x        Urinalysis Basic - ( 27 Oct 2024 21:46 )    Color: x / Appearance: x / SG: x / pH: x  Gluc: 117 mg/dL / Ketone: x  / Bili: x / Urobili: x   Blood: x / Protein: x / Nitrite: x   Leuk Esterase: x / RBC: x / WBC x   Sq Epi: x / Non Sq Epi: x / Bacteria: x      ____________________________________________    RADIOLOGY & ADDITIONAL STUDIES:

## 2024-10-28 NOTE — CONSULT NOTE ADULT - ASSESSMENT
39 yo M no PMH, ETOH use p/w acute onset abdominal pain that woke him up, CT showed gallbladder sludge adm w acute interstitial edematous pancreatitis likely gallstone related, noted to have JENNY, hyponatremia, hepatic steatosis.

## 2024-10-28 NOTE — CONSULT NOTE ADULT - PROBLEM SELECTOR RECOMMENDATION 9
ddx gallstone related vs hyperTG (noted triglycerides in 1000s) vs alcohol induced  - as per surgery, likely inpt cholecystectomy prior to DC  - trend LFTS  - symptomatic support - IVF, pain control prn, clears and adv as tolerates  - repeat TG level with PCP - pt reports hx of elevated numbers outpt  - monitor lytes and replete - hypomg was repleted but continue to moniter  - trend lactate - should improved w/ IVF

## 2024-10-28 NOTE — ED PROVIDER NOTE - OBJECTIVE STATEMENT
38-year-old male someday alcohol drinker with no other past medical history presents to the emergency department due to acute onset abdominal pain primarily in the epigastrium for the past day.  He denies any previous abdominal surgeries, has no active vomiting, no fevers or chills.

## 2024-10-28 NOTE — CONSULT NOTE ADULT - PROBLEM SELECTOR RECOMMENDATION 5
dvt ppx: lovenox  no PT needs  Dispo: pending symptom improvement  PCP Dr Charley Zamora dvt ppx: lovenox  no PT needs  Dispo: pending symptom improvement  Would repeat HCG prior to dc - if remains elevated will need outpt follow up   PCP Dr Charley Zamora

## 2024-10-28 NOTE — H&P ADULT - ASSESSMENT
38 y M with no significant pmh, presenting with acute onset abdominal pain that woke him up this morning, No associated nausea, vomiting, fevers or chills. In the ED, patient was afebrile, VSS, WBC 10. Na 129, T bili 2.4, Alk phos 172, , , lipase 1122, lactate 5.2. CT showed gallbladder sludge, normal CBD, acute interstitial edematous pancreatitis. Surgery consulted for evaluation.     Plan  Admit to Surgery under Dr. Wetzel   Patient likely have gallstone pancreatitis, given radiographic evidence of sludge  Diet CLD  Pain control   IVF   Monitor UOP  Trend LFTs, no plan for MRCP now   f/u repeat lactate  DVT prophylaxis        [facility]   [id number] 38 y M with no significant pmh, presenting with acute onset abdominal pain that woke him up this morning, No associated nausea, vomiting, fevers or chills. Patient reports having 5 drink on the weekend, In the ED, patient was afebrile, VSS, WBC 10. Na 129, T bili 2.4, Alk phos 172, , , lipase 1122, lactate 5.2. CT showed gallbladder sludge, normal CBD, acute interstitial edematous pancreatitis.  Surgery consulted for evaluation.     Plan  Admit to Surgery under Dr. Wetzel   Patient likely have gallstone pancreatitis, given radiographic evidence of sludge  Diet CLD  CIWA   Pain control   IVF   Monitor UOP  Trend LFTs, no plan for MRCP now   f/u repeat lactate  DVT prophylaxis        [facility]   [id number] 38 y M with no significant pmh, presenting with acute onset abdominal pain that woke him up this morning, No associated nausea, vomiting, fevers or chills. Patient reports having 5 drink on the weekend, In the ED, patient was afebrile, VSS, WBC 10. Na 129, T bili 2.4, Alk phos 172, , , lipase 1122, lactate 5.2. CT showed gallbladder sludge, normal CBD, acute interstitial edematous pancreatitis.  Surgery consulted for evaluation.     Plan  Admit to Surgery under Dr. Wetzel   Patient likely have gallstone pancreatitis, given radiographic evidence of sludge  Diet CLD  CIWA   Pain control   IVF   Monitor UOP  Trend LFTs, no plan for MRCP now   hypocalcemia s/p repletion, f/u Ca  f/u repeat lactate  DVT prophylaxis        [facility]   [id number]

## 2024-10-28 NOTE — ED PROVIDER NOTE - CLINICAL SUMMARY MEDICAL DECISION MAKING FREE TEXT BOX
Patient presenting with epigastric abdominal pain, however stating that it goes to his back, will obtain CT angiogram of chest abdomen and pelvis to assess for aortic pathology.    Lipase found to be significantly elevated consistent with acute pancreatitis.  Surgery consulted as LFTs are significantly abnormal, presumably due to gallstone pancreatitis.  At the request, right upper quadrant ultrasound ordered.    Surgery accepts patient to their service for further management, still pending right upper quadrant ultrasound.

## 2024-10-28 NOTE — ED PROVIDER NOTE - ATTENDING CONTRIBUTION TO CARE
This is a 38-year-old gentleman who is coming in with vague abdominal pain both above and below the umbilicus which started suddenly and severe on waking up this morning.  No vomiting no diarrhea.  Patient is awake alert talking and appears uncomfortable.  Abdomen is nontender.  He is markedly tachycardic.  He is slightly dry appearing.  He has no leg edema.  Given the findings concern for pain out of proportion/sudden onset, pancreatitis versus aortic dissection most likely.  CT angio chest abdomen pelvis was performed and laboratory results also showed pancreatitis.  CBC CMP lipase.  Patient had an elevated lactate of 5.  After fluids it came down to 3.  Suspect that this is related to his pancreatitis and his inflammatory state is also related to pancreatitis and it is less likely that this is sepsis.  Consult surgery given the findings as there may be gallstone pancreatitis as the cause.  Triglycerides ordered.  Patient was endorsed to overnight doctor at the change of shift for further management of the patient planned admission.

## 2024-10-29 DIAGNOSIS — E78.1 PURE HYPERGLYCERIDEMIA: ICD-10-CM

## 2024-10-29 DIAGNOSIS — E83.51 HYPOCALCEMIA: ICD-10-CM

## 2024-10-29 DIAGNOSIS — E87.20 ACIDOSIS, UNSPECIFIED: ICD-10-CM

## 2024-10-29 LAB
A1C WITH ESTIMATED AVERAGE GLUCOSE RESULT: 5.5 % — SIGNIFICANT CHANGE UP (ref 4–5.6)
ADD ON TEST-SPECIMEN IN LAB: SIGNIFICANT CHANGE UP
ALBUMIN SERPL ELPH-MCNC: 2.3 G/DL — LOW (ref 3.3–5)
ALP SERPL-CCNC: 84 U/L — SIGNIFICANT CHANGE UP (ref 40–120)
ALT FLD-CCNC: 115 U/L — HIGH (ref 10–45)
ANION GAP SERPL CALC-SCNC: 16 MMOL/L — SIGNIFICANT CHANGE UP (ref 5–17)
APPEARANCE UR: ABNORMAL
AST SERPL-CCNC: 215 U/L — HIGH (ref 10–40)
BACTERIA # UR AUTO: NEGATIVE /HPF — SIGNIFICANT CHANGE UP
BILIRUB DIRECT SERPL-MCNC: 2.4 MG/DL — HIGH (ref 0–0.3)
BILIRUB INDIRECT FLD-MCNC: 0.8 MG/DL — SIGNIFICANT CHANGE UP (ref 0.2–1)
BILIRUB SERPL-MCNC: 3.2 MG/DL — HIGH (ref 0.2–1.2)
BILIRUB UR-MCNC: ABNORMAL
BUN SERPL-MCNC: 23 MG/DL — SIGNIFICANT CHANGE UP (ref 7–23)
BUN SERPL-MCNC: 25 MG/DL — HIGH (ref 7–23)
BUN SERPL-MCNC: 25 MG/DL — HIGH (ref 7–23)
CA-I BLD-SCNC: 0.88 MMOL/L — LOW (ref 1.15–1.33)
CALCIUM SERPL-MCNC: 5.9 MG/DL — CRITICAL LOW (ref 8.4–10.5)
CALCIUM SERPL-MCNC: 5.9 MG/DL — CRITICAL LOW (ref 8.4–10.5)
CALCIUM SERPL-MCNC: 6.2 MG/DL — CRITICAL LOW (ref 8.4–10.5)
CALCIUM SERPL-MCNC: 6.8 MG/DL — LOW (ref 8.4–10.5)
CAST: 52 /LPF — HIGH (ref 0–4)
CHLORIDE SERPL-SCNC: 97 MMOL/L — SIGNIFICANT CHANGE UP (ref 96–108)
CHLORIDE SERPL-SCNC: 98 MMOL/L — SIGNIFICANT CHANGE UP (ref 96–108)
CHLORIDE SERPL-SCNC: 98 MMOL/L — SIGNIFICANT CHANGE UP (ref 96–108)
CO2 SERPL-SCNC: 15 MMOL/L — LOW (ref 22–31)
CO2 SERPL-SCNC: 16 MMOL/L — LOW (ref 22–31)
CO2 SERPL-SCNC: 18 MMOL/L — LOW (ref 22–31)
COLOR SPEC: ABNORMAL
CREAT ?TM UR-MCNC: 218 MG/DL — SIGNIFICANT CHANGE UP
CREAT SERPL-MCNC: 1.74 MG/DL — HIGH (ref 0.5–1.3)
CREAT SERPL-MCNC: 2.43 MG/DL — HIGH (ref 0.5–1.3)
CREAT SERPL-MCNC: 2.46 MG/DL — HIGH (ref 0.5–1.3)
DIFF PNL FLD: ABNORMAL
EGFR: 34 ML/MIN/1.73M2 — LOW
EGFR: 34 ML/MIN/1.73M2 — LOW
EGFR: 51 ML/MIN/1.73M2 — LOW
ESTIMATED AVERAGE GLUCOSE: 111 MG/DL — SIGNIFICANT CHANGE UP (ref 68–114)
GAS PNL BLDV: SIGNIFICANT CHANGE UP
GLUCOSE SERPL-MCNC: 156 MG/DL — HIGH (ref 70–99)
GLUCOSE SERPL-MCNC: 175 MG/DL — HIGH (ref 70–99)
GLUCOSE SERPL-MCNC: 177 MG/DL — HIGH (ref 70–99)
GLUCOSE UR QL: NEGATIVE MG/DL — SIGNIFICANT CHANGE UP
HCT VFR BLD CALC: 39.6 % — SIGNIFICANT CHANGE UP (ref 39–50)
HGB BLD-MCNC: 13.3 G/DL — SIGNIFICANT CHANGE UP (ref 13–17)
KETONES UR-MCNC: NEGATIVE MG/DL — SIGNIFICANT CHANGE UP
LACTATE SERPL-SCNC: 2.1 MMOL/L — HIGH (ref 0.5–2)
LACTATE SERPL-SCNC: 2.8 MMOL/L — HIGH (ref 0.5–2)
LEUKOCYTE ESTERASE UR-ACNC: ABNORMAL
MAGNESIUM SERPL-MCNC: 1.5 MG/DL — LOW (ref 1.6–2.6)
MAGNESIUM SERPL-MCNC: 1.5 MG/DL — LOW (ref 1.6–2.6)
MAGNESIUM SERPL-MCNC: 1.9 MG/DL — SIGNIFICANT CHANGE UP (ref 1.6–2.6)
MCHC RBC-ENTMCNC: 27.3 PG — SIGNIFICANT CHANGE UP (ref 27–34)
MCHC RBC-ENTMCNC: 33.6 GM/DL — SIGNIFICANT CHANGE UP (ref 32–36)
MCV RBC AUTO: 81.1 FL — SIGNIFICANT CHANGE UP (ref 80–100)
NITRITE UR-MCNC: POSITIVE
NRBC # BLD: 0 /100 WBCS — SIGNIFICANT CHANGE UP (ref 0–0)
OSMOLALITY UR: 385 MOS/KG — SIGNIFICANT CHANGE UP (ref 300–900)
PH UR: 5 — SIGNIFICANT CHANGE UP (ref 5–8)
PHOSPHATE SERPL-MCNC: 2.3 MG/DL — LOW (ref 2.5–4.5)
PHOSPHATE SERPL-MCNC: 3.3 MG/DL — SIGNIFICANT CHANGE UP (ref 2.5–4.5)
PHOSPHATE SERPL-MCNC: 3.5 MG/DL — SIGNIFICANT CHANGE UP (ref 2.5–4.5)
PLATELET # BLD AUTO: 96 K/UL — LOW (ref 150–400)
POTASSIUM SERPL-MCNC: 4.1 MMOL/L — SIGNIFICANT CHANGE UP (ref 3.5–5.3)
POTASSIUM SERPL-MCNC: 4.5 MMOL/L — SIGNIFICANT CHANGE UP (ref 3.5–5.3)
POTASSIUM SERPL-MCNC: 4.6 MMOL/L — SIGNIFICANT CHANGE UP (ref 3.5–5.3)
POTASSIUM SERPL-SCNC: 4.1 MMOL/L — SIGNIFICANT CHANGE UP (ref 3.5–5.3)
POTASSIUM SERPL-SCNC: 4.5 MMOL/L — SIGNIFICANT CHANGE UP (ref 3.5–5.3)
POTASSIUM SERPL-SCNC: 4.6 MMOL/L — SIGNIFICANT CHANGE UP (ref 3.5–5.3)
PROT SERPL-MCNC: 5.4 G/DL — LOW (ref 6–8.3)
PROT UR-MCNC: 30 MG/DL
PTH-INTACT FLD-MCNC: 32 PG/ML — SIGNIFICANT CHANGE UP (ref 15–65)
RBC # BLD: 4.88 M/UL — SIGNIFICANT CHANGE UP (ref 4.2–5.8)
RBC # FLD: 17.4 % — HIGH (ref 10.3–14.5)
RBC CASTS # UR COMP ASSIST: 314 /HPF — HIGH (ref 0–4)
REVIEW: SIGNIFICANT CHANGE UP
SODIUM SERPL-SCNC: 129 MMOL/L — LOW (ref 135–145)
SODIUM SERPL-SCNC: 130 MMOL/L — LOW (ref 135–145)
SODIUM SERPL-SCNC: 131 MMOL/L — LOW (ref 135–145)
SODIUM UR-SCNC: 10 MMOL/L — SIGNIFICANT CHANGE UP
SP GR SPEC: >1.03 — HIGH (ref 1–1.03)
SQUAMOUS # UR AUTO: 16 /HPF — HIGH (ref 0–5)
T4 AB SER-ACNC: 3.9 UG/DL — LOW (ref 4.6–12)
TSH SERPL-MCNC: 3.48 UIU/ML — SIGNIFICANT CHANGE UP (ref 0.27–4.2)
UROBILINOGEN FLD QL: 1 MG/DL — SIGNIFICANT CHANGE UP (ref 0.2–1)
WBC # BLD: 10.86 K/UL — HIGH (ref 3.8–10.5)
WBC # FLD AUTO: 10.86 K/UL — HIGH (ref 3.8–10.5)
WBC UR QL: 3 /HPF — SIGNIFICANT CHANGE UP (ref 0–5)

## 2024-10-29 PROCEDURE — 99233 SBSQ HOSP IP/OBS HIGH 50: CPT

## 2024-10-29 PROCEDURE — 99232 SBSQ HOSP IP/OBS MODERATE 35: CPT

## 2024-10-29 PROCEDURE — 99223 1ST HOSP IP/OBS HIGH 75: CPT

## 2024-10-29 RX ORDER — MAGNESIUM SULFATE IN 0.9% NACL 2 G/50 ML
2 INTRAVENOUS SOLUTION, PIGGYBACK (ML) INTRAVENOUS ONCE
Refills: 0 | Status: COMPLETED | OUTPATIENT
Start: 2024-10-29 | End: 2024-10-29

## 2024-10-29 RX ORDER — SODIUM CHLORIDE 9 MG/ML
500 INJECTION, SOLUTION INTRAMUSCULAR; INTRAVENOUS; SUBCUTANEOUS ONCE
Refills: 0 | Status: DISCONTINUED | OUTPATIENT
Start: 2024-10-29 | End: 2024-10-29

## 2024-10-29 RX ORDER — SODIUM PHOSPHATE, MONOBASIC, MONOHYDRATE AND SODIUM PHOSPHATE, DIBASIC ANHYDROUS 276; 142 MG/ML; MG/ML
15 INJECTION, SOLUTION INTRAVENOUS ONCE
Refills: 0 | Status: COMPLETED | OUTPATIENT
Start: 2024-10-29 | End: 2024-10-29

## 2024-10-29 RX ORDER — CALCIUM GLUCONATE 98 MG/ML
2 INJECTION, SOLUTION INTRAVENOUS ONCE
Refills: 0 | Status: COMPLETED | OUTPATIENT
Start: 2024-10-29 | End: 2024-10-29

## 2024-10-29 RX ORDER — FENOFIBRATE 145 MG/1
145 TABLET, FILM COATED ORAL DAILY
Refills: 0 | Status: DISCONTINUED | OUTPATIENT
Start: 2024-10-29 | End: 2024-11-05

## 2024-10-29 RX ORDER — OMEGA-3/EPA/FISH OIL 910-1300MG
2 CAPSULE,DELAYED RELEASE (ENTERIC COATED) ORAL
Refills: 0 | Status: DISCONTINUED | OUTPATIENT
Start: 2024-10-29 | End: 2024-11-05

## 2024-10-29 RX ADMIN — Medication 64.8 MILLIGRAM(S): at 22:25

## 2024-10-29 RX ADMIN — Medication 650 MILLIGRAM(S): at 06:18

## 2024-10-29 RX ADMIN — OXYCODONE HYDROCHLORIDE 10 MILLIGRAM(S): 30 TABLET ORAL at 07:06

## 2024-10-29 RX ADMIN — Medication 64.8 MILLIGRAM(S): at 14:49

## 2024-10-29 RX ADMIN — Medication 650 MILLIGRAM(S): at 14:49

## 2024-10-29 RX ADMIN — FENOFIBRATE 145 MILLIGRAM(S): 145 TABLET, FILM COATED ORAL at 16:57

## 2024-10-29 RX ADMIN — Medication 40 MILLIGRAM(S): at 12:56

## 2024-10-29 RX ADMIN — Medication 650 MILLIGRAM(S): at 00:37

## 2024-10-29 RX ADMIN — OXYCODONE HYDROCHLORIDE 10 MILLIGRAM(S): 30 TABLET ORAL at 12:55

## 2024-10-29 RX ADMIN — Medication 650 MILLIGRAM(S): at 20:47

## 2024-10-29 RX ADMIN — CALCIUM GLUCONATE 200 GRAM(S): 98 INJECTION, SOLUTION INTRAVENOUS at 16:57

## 2024-10-29 RX ADMIN — Medication 25 GRAM(S): at 08:44

## 2024-10-29 RX ADMIN — Medication 650 MILLIGRAM(S): at 01:07

## 2024-10-29 RX ADMIN — CALCIUM GLUCONATE 200 GRAM(S): 98 INJECTION, SOLUTION INTRAVENOUS at 19:13

## 2024-10-29 RX ADMIN — Medication 2 GRAM(S): at 20:17

## 2024-10-29 RX ADMIN — Medication 64.8 MILLIGRAM(S): at 06:18

## 2024-10-29 RX ADMIN — SODIUM PHOSPHATE, MONOBASIC, MONOHYDRATE AND SODIUM PHOSPHATE, DIBASIC ANHYDROUS 63.75 MILLIMOLE(S): 276; 142 INJECTION, SOLUTION INTRAVENOUS at 20:16

## 2024-10-29 RX ADMIN — Medication 2 GRAM(S): at 16:58

## 2024-10-29 RX ADMIN — Medication 25 GRAM(S): at 20:17

## 2024-10-29 RX ADMIN — Medication 1000 MILLILITER(S): at 07:06

## 2024-10-29 RX ADMIN — Medication 650 MILLIGRAM(S): at 06:48

## 2024-10-29 RX ADMIN — CALCIUM GLUCONATE 50 GRAM(S): 98 INJECTION, SOLUTION INTRAVENOUS at 03:34

## 2024-10-29 RX ADMIN — OXYCODONE HYDROCHLORIDE 10 MILLIGRAM(S): 30 TABLET ORAL at 07:36

## 2024-10-29 RX ADMIN — Medication 650 MILLIGRAM(S): at 20:17

## 2024-10-29 NOTE — PROGRESS NOTE ADULT - NSPROGADDITIONALINFOA_GEN_ALL_CORE
The necessity of the time spent during the encounter on this date of service was due to:   - Ordering, reviewing, and interpreting labs, testing, and imaging  - Independently obtaining a review of systems and performing a physical exam  - Reviewing prior hospitalization and where necessary, outpatient records  - Reviewing consultant recommendations/communicating with consultants  - Counselling and educating patient and family regarding interpretation of aforementioned items and plan of care    Time-based billing (NON-critical care). Total minutes spent: 50

## 2024-10-29 NOTE — PROGRESS NOTE ADULT - ASSESSMENT
38 y M with no significant pmh, presenting with acute onset abdominal pain that woke him up this morning, No associated nausea, vomiting, fevers or chills. Patient reports having 5 drink on the weekend, In the ED, patient was afebrile, VSS, WBC 10. Na 129, T bili 2.4, Alk phos 172, , , lipase 1122, lactate 5.2. CT showed gallbladder sludge, normal CBD, acute interstitial edematous pancreatitis.  Admitted to surgery service for gallstone pancreatitis.     Plan:  Diet: Will remain on CLD  CIWA   Pain control   IVF, 1L bolus given this AM. Will follow up lactate this AM  Monitor UOP  Trend LFTs, no plan for MRCP now   hypocalcemia s/p repletion, f/u Ca      ACS Surgery

## 2024-10-29 NOTE — PROGRESS NOTE ADULT - PROBLEM SELECTOR PLAN 2
suspect prerenal vs postobstructive. pt denies hx of CKD  - IVF as above  - bladder scan for PVR  - urine lytes pending, UA pending suspect due to inflammatory state from pancreatitis/prerenal vs postobstructive. pt denies hx of CKD. appears to be plateauing around 2.4  - monitor cr  - IVF as above  - bladder scan for PVR - no retention  - UA with hematuria and casts w/o urinary sx  - monitor volume status

## 2024-10-29 NOTE — PROGRESS NOTE ADULT - SUBJECTIVE AND OBJECTIVE BOX
SURGERY DAILY PROGRESS NOTE:     SUBJECTIVE/ROS: Patient seen and examined. Patient reports still having abdominal pain however controlled.  Denies nausea, vomiting, chest pain, shortness of breath     MEDICATIONS  (STANDING):  acetaminophen     Tablet .. 650 milliGRAM(s) Oral every 6 hours  enoxaparin Injectable 40 milliGRAM(s) SubCutaneous every 24 hours  influenza   Vaccine 0.5 milliLiter(s) IntraMuscular once  lactated ringers Bolus 1000 milliLiter(s) IV Bolus once  lactated ringers. 1000 milliLiter(s) (150 mL/Hr) IV Continuous <Continuous>  PHENobarbital   Oral   PHENobarbital 64.8 milliGRAM(s) Oral every 8 hours    MEDICATIONS  (PRN):  oxyCODONE    IR 5 milliGRAM(s) Oral every 6 hours PRN Moderate Pain (4 - 6)  oxyCODONE    IR 10 milliGRAM(s) Oral every 6 hours PRN Severe Pain (7 - 10)      OBJECTIVE:  Vital Signs Last 24 Hrs  T(C): 36.9 (29 Oct 2024 05:22), Max: 36.9 (28 Oct 2024 08:35)  T(F): 98.4 (29 Oct 2024 05:22), Max: 98.5 (28 Oct 2024 08:35)  HR: 105 (29 Oct 2024 05:22) (105 - 128)  BP: 104/65 (29 Oct 2024 05:22) (104/65 - 135/101)  BP(mean): --  RR: 18 (29 Oct 2024 05:22) (18 - 18)  SpO2: 93% (29 Oct 2024 05:22) (93% - 96%)    Parameters below as of 29 Oct 2024 05:22  Patient On (Oxygen Delivery Method): room air      I&O's Detail    27 Oct 2024 07:01  -  28 Oct 2024 07:00  --------------------------------------------------------  IN:    Lactated Ringers: 240 mL  Total IN: 240 mL    OUT:  Total OUT: 0 mL    Total NET: 240 mL      28 Oct 2024 07:01  -  29 Oct 2024 06:25  --------------------------------------------------------  IN:    Oral Fluid: 210 mL  Total IN: 210 mL    OUT:    Voided (mL): 126 mL  Total OUT: 126 mL  Total NET: 84 mL      LABS:                        15.8   9.81  )-----------( 144      ( 28 Oct 2024 06:38 )             47.1     10-29    129[L]  |  98  |  23  ----------------------------<  177[H]  4.6   |  15[L]  |  2.43[H]    Ca    5.9[LL]      29 Oct 2024 00:54  Phos  3.5     10-29  Mg     1.5     10-29    TPro  5.8[L]  /  Alb  3.0[L]  /  TBili  2.7[H]  /  DBili  x   /  AST  456[H]  /  ALT  183[H]  /  AlkPhos  120  10-28    PT/INR - ( 28 Oct 2024 09:40 )   PT: 11.7 sec;   INR: 1.03 ratio         PTT - ( 28 Oct 2024 09:40 )  PTT:30.2 sec  Urinalysis Basic - ( 29 Oct 2024 03:39 )    Color: Orange / Appearance: Turbid / SG: >1.030 / pH: x  Gluc: x / Ketone: Negative mg/dL  / Bili: Moderate / Urobili: 1.0 mg/dL   Blood: x / Protein: 30 mg/dL / Nitrite: Positive   Leuk Esterase: Small / RBC: 314 /HPF / WBC 3 /HPF   Sq Epi: x / Non Sq Epi: 16 /HPF / Bacteria: Negative /HPF    PHYSICAL EXAM:  GENERAL:  Well appearing, in NAD  EYES: conjunctiva clear  NECK: Supple, No JVD  ABDOMEN: Soft, non distended, appropriately tender  EXTREMITIES:  2+ Peripheral Pulses, No clubbing, cyanosis, edema.  PSYCH: AAOx3, appropriate affect  SKIN: No rashes or lesions

## 2024-10-29 NOTE — CONSULT NOTE ADULT - ASSESSMENT
Patient is a 38 year old male with no significant PMH who presented to the hospital with pancreatitis and hypertriglyceridemia.   Endocrinology consulted for hypertriglyceridemia.    #Hypertriglyceridemia-induced pancreatitis with worrisome features  Lipase 1122, CT with acute pancreatitis, TG 1649 on presentation  Today, corrected Ca 7.6, mag 1.5, tachycardic over 100 bpm on exam  PLAN:  - Repeat TG level was added on to AM labs by primary team, result pending.  Preliminary recs- incomplete note- pending discussion with attending physician:  Follow up TG. If still over 1000, would consider ICU transfer for insulin drip. If under 1000, would start atorvastatin 80 mg qhs, fenofibrate 145 mg daily, Lovaza 2 g BID.   Monitor TG level  Pancreatitis treatment per primary team  BID checks for calcium and magnesium, replete to corrected calcium goal of 8.5, Mg goal of 2.0  Patient will need follow up with [facility] lipid clinic, number to be provided in note    Pending discussion with attending physician.  INCOMPLETE NOTE  Patient is a 38 year old male with no significant PMH who presented to the hospital with pancreatitis and hypertriglyceridemia.   Endocrinology consulted for hypertriglyceridemia.    #Hypertriglyceridemia-induced pancreatitis with worrisome features  #Hypocalcemia  #JENNY  #Lactic acidosis  Lipase 1122, CT with acute pancreatitis, TG 1649 on presentation, lactic acidosis  Today, corrected Ca 7.6, mag 1.5, tachycardic over 100 bpm on exam, Cr rising  PLAN:  - Repeat TG level was added on to AM labs by primary team, result pending.  ·	Follow up TG:  ·	If TG is still over 1000, would recommend ICU transfer for insulin drip. Plasmapheresis is also an option if pancreatitis worsens.   ·	If TG is under 1000, would start atorvastatin 80 mg qhs, fenofibrate 145 mg daily, Lovaza 2 g BID.   - Monitor TG level daily  - Pancreatitis treatment per primary team  - BID checks for calcium and magnesium, replete to corrected calcium goal of 8.5, Mg goal of 2.0  - Patient will need follow up with [facility] lipid clinic, Dr. Angel Knox [phone] [address], [address], [place] [address]    Discussed with attending physician.  Trevor Robles DO   PGY-4 Endocrine Fellow  Can be reached via [employer] Teams.    For follow up questions, discharge recommendations or new consults, please email [email or web address] ([email or web address] or [email or web address] ([facility]) or call the answering service at [phone] (weekdays); [phone] (nights/weekends).   For emergencies, please page fellow on call.

## 2024-10-29 NOTE — PROGRESS NOTE ADULT - PROBLEM SELECTOR PLAN 3
likely due to hypovolemia vs siadh from pain  - monitor for now  - obtain  urine osmolality if sNa worsens likely due to hypovolemia vs siadh from pain  - monitor for now  - obtain  urine osmolality if sNa worsens    # Hypocalcemia  - replete    # Hypomagnesemia  - replete

## 2024-10-29 NOTE — PROGRESS NOTE ADULT - ASSESSMENT
38M no PMH, ETOH use p/w acute onset abdominal pain that woke him up, CT showed gallbladder sludge adm w acute interstitial edematous pancreatitis likely gallstone related, noted to have JENNY, hyponatremia, hepatic steatosis.

## 2024-10-29 NOTE — PROGRESS NOTE ADULT - SUBJECTIVE AND OBJECTIVE BOX
TRAUMA SURGERY [facility] MEDICINE PROGRESS NOTE    Alvaro Jaime MD  Division of Hospital Medicine  Available via MS teams  If no response or off hours, page [phone]    Patient is a 38y old  Male who presents with a chief complaint of Gallstone pancreatitis (29 Oct 2024 06:25)    SUBJECTIVE / OVERNIGHT EVENTS: No acute events overnight. Pt seen and examined at bedside. Feeling improved overall, tolerating PO, abd pain improved.     CAPILLARY BLOOD GLUCOSE      I&O's Summary    28 Oct 2024 07:01  -  29 Oct 2024 07:00  --------------------------------------------------------  IN: 2110 mL / OUT: 126 mL / NET: 1984 mL    29 Oct 2024 07:01  -  29 Oct 2024 11:38  --------------------------------------------------------  IN: 2050 mL / OUT: 100 mL / NET: 1950 mL        Vital Signs Last 24 Hrs  T(C): 37 (29 Oct 2024 09:44), Max: 37 (29 Oct 2024 09:44)  T(F): 98.6 (29 Oct 2024 09:44), Max: 98.6 (29 Oct 2024 09:44)  HR: 107 (29 Oct 2024 09:44) (105 - 122)  BP: 121/83 (29 Oct 2024 09:44) (104/65 - 130/94)  RR: 18 (29 Oct 2024 09:44) (18 - 18)  SpO2: 95% (29 Oct 2024 09:44) (93% - 95%)    Parameters below as of 29 Oct 2024 09:44  Patient On (Oxygen Delivery Method): room air        PHYSICAL EXAM:  GENERAL:  Well appearing, in NAD  HEAD:  NCAT  EYES: conjunctiva clear  NECK: Supple, No JVD  CHEST/LUNG: CTA B/L. No w/r/r.  HEART: Reg rate. Normal S1, S2. No m/r/g.   ABDOMEN: soft, mild tenderness, nondistended  EXTREMITIES:  2+ Peripheral Pulses, No clubbing, cyanosis, edema.  PSYCH: AAOx3, appropriate affect  NEUROLOGY: grossly non-focal  SKIN: No rashes or lesions    LABS:                        13.3   10.86 )-----------( 96       ( 29 Oct 2024 07:03 )             39.6     10-29    130[L]  |  98  |  25[H]  ----------------------------<  175[H]  4.5   |  16[L]  |  2.46[H]    Ca    6.2[LL]      29 Oct 2024 07:04  Phos  3.3     10-29  Mg     1.5     10-29    TPro  5.4[L]  /  Alb  2.3[L]  /  TBili  3.2[H]  /  DBili  2.4[H]  /  AST  215[H]  /  ALT  115[H]  /  AlkPhos  84  10-29    PT/INR - ( 28 Oct 2024 09:40 )   PT: 11.7 sec;   INR: 1.03 ratio         PTT - ( 28 Oct 2024 09:40 )  PTT:30.2 sec      Urinalysis Basic - ( 29 Oct 2024 07:04 )    Color: x / Appearance: x / SG: x / pH: x  Gluc: 175 mg/dL / Ketone: x  / Bili: x / Urobili: x   Blood: x / Protein: x / Nitrite: x   Leuk Esterase: x / RBC: x / WBC x   Sq Epi: x / Non Sq Epi: x / Bacteria: x        Culture - Blood (collected 27 Oct 2024 21:40)  Source: .Blood BLOOD  Preliminary Report (29 Oct 2024 01:02):    No growth at 24 hours    Culture - Blood (collected 27 Oct 2024 21:25)  Source: .Blood BLOOD  Preliminary Report (29 Oct 2024 01:02):    No growth at 24 hours        MEDICATIONS  (STANDING):  acetaminophen     Tablet .. 650 milliGRAM(s) Oral every 6 hours  enoxaparin Injectable 40 milliGRAM(s) SubCutaneous every 24 hours  influenza   Vaccine 0.5 milliLiter(s) IntraMuscular once  lactated ringers. 1000 milliLiter(s) (150 mL/Hr) IV Continuous <Continuous>  PHENobarbital   Oral   PHENobarbital 64.8 milliGRAM(s) Oral every 8 hours    MEDICATIONS  (PRN):  oxyCODONE    IR 10 milliGRAM(s) Oral every 6 hours PRN Severe Pain (7 - 10)  oxyCODONE    IR 5 milliGRAM(s) Oral every 6 hours PRN Moderate Pain (4 - 6)

## 2024-10-30 DIAGNOSIS — E78.2 MIXED HYPERLIPIDEMIA: ICD-10-CM

## 2024-10-30 LAB
ADD ON TEST-SPECIMEN IN LAB: SIGNIFICANT CHANGE UP
ADD ON TEST-SPECIMEN IN LAB: SIGNIFICANT CHANGE UP
ALBUMIN SERPL ELPH-MCNC: 2.4 G/DL — LOW (ref 3.3–5)
ALBUMIN SERPL ELPH-MCNC: 2.4 G/DL — LOW (ref 3.3–5)
ALP SERPL-CCNC: 85 U/L — SIGNIFICANT CHANGE UP (ref 40–120)
ALP SERPL-CCNC: 92 U/L — SIGNIFICANT CHANGE UP (ref 40–120)
ALT FLD-CCNC: 89 U/L — HIGH (ref 10–45)
ALT FLD-CCNC: 94 U/L — HIGH (ref 10–45)
ANION GAP SERPL CALC-SCNC: 10 MMOL/L — SIGNIFICANT CHANGE UP (ref 5–17)
ANION GAP SERPL CALC-SCNC: 10 MMOL/L — SIGNIFICANT CHANGE UP (ref 5–17)
ANION GAP SERPL CALC-SCNC: 14 MMOL/L — SIGNIFICANT CHANGE UP (ref 5–17)
ANION GAP SERPL CALC-SCNC: 22 MMOL/L — HIGH (ref 5–17)
AST SERPL-CCNC: 166 U/L — HIGH (ref 10–40)
AST SERPL-CCNC: 178 U/L — HIGH (ref 10–40)
BILIRUB DIRECT SERPL-MCNC: 2.9 MG/DL — HIGH (ref 0–0.3)
BILIRUB DIRECT SERPL-MCNC: 3.4 MG/DL — HIGH (ref 0–0.3)
BILIRUB INDIRECT FLD-MCNC: 0.9 MG/DL — SIGNIFICANT CHANGE UP (ref 0.2–1)
BILIRUB INDIRECT FLD-MCNC: 1.1 MG/DL — HIGH (ref 0.2–1)
BILIRUB SERPL-MCNC: 4 MG/DL — HIGH (ref 0.2–1.2)
BILIRUB SERPL-MCNC: 4.3 MG/DL — HIGH (ref 0.2–1.2)
BUN SERPL-MCNC: 14 MG/DL — SIGNIFICANT CHANGE UP (ref 7–23)
BUN SERPL-MCNC: 14 MG/DL — SIGNIFICANT CHANGE UP (ref 7–23)
BUN SERPL-MCNC: 19 MG/DL — SIGNIFICANT CHANGE UP (ref 7–23)
BUN SERPL-MCNC: 23 MG/DL — SIGNIFICANT CHANGE UP (ref 7–23)
CA-I BLD-SCNC: 0.71 MMOL/L — LOW (ref 1.15–1.33)
CA-I BLD-SCNC: 0.93 MMOL/L — LOW (ref 1.15–1.33)
CALCIUM SERPL-MCNC: 6.2 MG/DL — CRITICAL LOW (ref 8.4–10.5)
CALCIUM SERPL-MCNC: 6.7 MG/DL — LOW (ref 8.4–10.5)
CALCIUM SERPL-MCNC: 6.7 MG/DL — LOW (ref 8.4–10.5)
CALCIUM SERPL-MCNC: 7.1 MG/DL — LOW (ref 8.4–10.5)
CHLORIDE SERPL-SCNC: 85 MMOL/L — LOW (ref 96–108)
CHLORIDE SERPL-SCNC: 97 MMOL/L — SIGNIFICANT CHANGE UP (ref 96–108)
CHLORIDE SERPL-SCNC: 97 MMOL/L — SIGNIFICANT CHANGE UP (ref 96–108)
CHLORIDE SERPL-SCNC: 98 MMOL/L — SIGNIFICANT CHANGE UP (ref 96–108)
CO2 SERPL-SCNC: 19 MMOL/L — LOW (ref 22–31)
CO2 SERPL-SCNC: 20 MMOL/L — LOW (ref 22–31)
CO2 SERPL-SCNC: 20 MMOL/L — LOW (ref 22–31)
CO2 SERPL-SCNC: 21 MMOL/L — LOW (ref 22–31)
CREAT SERPL-MCNC: 0.79 MG/DL — SIGNIFICANT CHANGE UP (ref 0.5–1.3)
CREAT SERPL-MCNC: 0.81 MG/DL — SIGNIFICANT CHANGE UP (ref 0.5–1.3)
CREAT SERPL-MCNC: 1.04 MG/DL — SIGNIFICANT CHANGE UP (ref 0.5–1.3)
CREAT SERPL-MCNC: 1.37 MG/DL — HIGH (ref 0.5–1.3)
EGFR: 116 ML/MIN/1.73M2 — SIGNIFICANT CHANGE UP
EGFR: 117 ML/MIN/1.73M2 — SIGNIFICANT CHANGE UP
EGFR: 68 ML/MIN/1.73M2 — SIGNIFICANT CHANGE UP
EGFR: 94 ML/MIN/1.73M2 — SIGNIFICANT CHANGE UP
GLUCOSE BLDC GLUCOMTR-MCNC: 148 MG/DL — HIGH (ref 70–99)
GLUCOSE SERPL-MCNC: 136 MG/DL — HIGH (ref 70–99)
GLUCOSE SERPL-MCNC: 162 MG/DL — HIGH (ref 70–99)
GLUCOSE SERPL-MCNC: 166 MG/DL — HIGH (ref 70–99)
GLUCOSE SERPL-MCNC: 535 MG/DL — CRITICAL HIGH (ref 70–99)
HCT VFR BLD CALC: 33.1 % — LOW (ref 39–50)
HGB BLD-MCNC: 11.4 G/DL — LOW (ref 13–17)
LACTATE BLDV-MCNC: 0.9 MMOL/L — SIGNIFICANT CHANGE UP (ref 0.5–2)
LACTATE SERPL-SCNC: 1.6 MMOL/L — SIGNIFICANT CHANGE UP (ref 0.5–2)
MAGNESIUM SERPL-MCNC: 2.4 MG/DL — SIGNIFICANT CHANGE UP (ref 1.6–2.6)
MAGNESIUM SERPL-MCNC: 2.5 MG/DL — SIGNIFICANT CHANGE UP (ref 1.6–2.6)
MAGNESIUM SERPL-MCNC: 2.6 MG/DL — SIGNIFICANT CHANGE UP (ref 1.6–2.6)
MAGNESIUM SERPL-MCNC: 2.6 MG/DL — SIGNIFICANT CHANGE UP (ref 1.6–2.6)
MCHC RBC-ENTMCNC: 28.1 PG — SIGNIFICANT CHANGE UP (ref 27–34)
MCHC RBC-ENTMCNC: 34.4 G/DL — SIGNIFICANT CHANGE UP (ref 32–36)
MCV RBC AUTO: 81.5 FL — SIGNIFICANT CHANGE UP (ref 80–100)
NRBC # BLD: 0 /100 WBCS — SIGNIFICANT CHANGE UP (ref 0–0)
PHOSPHATE SERPL-MCNC: 2 MG/DL — LOW (ref 2.5–4.5)
PHOSPHATE SERPL-MCNC: 2.3 MG/DL — LOW (ref 2.5–4.5)
PHOSPHATE SERPL-MCNC: 2.9 MG/DL — SIGNIFICANT CHANGE UP (ref 2.5–4.5)
PHOSPHATE SERPL-MCNC: 22.2 MG/DL — HIGH (ref 2.5–4.5)
PLATELET # BLD AUTO: 110 K/UL — LOW (ref 150–400)
POTASSIUM SERPL-MCNC: 3.5 MMOL/L — SIGNIFICANT CHANGE UP (ref 3.5–5.3)
POTASSIUM SERPL-MCNC: 3.8 MMOL/L — SIGNIFICANT CHANGE UP (ref 3.5–5.3)
POTASSIUM SERPL-MCNC: 3.8 MMOL/L — SIGNIFICANT CHANGE UP (ref 3.5–5.3)
POTASSIUM SERPL-MCNC: 4.1 MMOL/L — SIGNIFICANT CHANGE UP (ref 3.5–5.3)
POTASSIUM SERPL-SCNC: 3.5 MMOL/L — SIGNIFICANT CHANGE UP (ref 3.5–5.3)
POTASSIUM SERPL-SCNC: 3.8 MMOL/L — SIGNIFICANT CHANGE UP (ref 3.5–5.3)
POTASSIUM SERPL-SCNC: 3.8 MMOL/L — SIGNIFICANT CHANGE UP (ref 3.5–5.3)
POTASSIUM SERPL-SCNC: 4.1 MMOL/L — SIGNIFICANT CHANGE UP (ref 3.5–5.3)
PROT SERPL-MCNC: 5.4 G/DL — LOW (ref 6–8.3)
PROT SERPL-MCNC: 5.5 G/DL — LOW (ref 6–8.3)
RBC # BLD: 4.06 M/UL — LOW (ref 4.2–5.8)
RBC # FLD: 17.2 % — HIGH (ref 10.3–14.5)
SODIUM SERPL-SCNC: 126 MMOL/L — LOW (ref 135–145)
SODIUM SERPL-SCNC: 127 MMOL/L — LOW (ref 135–145)
SODIUM SERPL-SCNC: 129 MMOL/L — LOW (ref 135–145)
SODIUM SERPL-SCNC: 131 MMOL/L — LOW (ref 135–145)
TRIGL SERPL-MCNC: 572 MG/DL — HIGH
TRIGL SERPL-MCNC: 741 MG/DL — HIGH
WBC # BLD: 11.5 K/UL — HIGH (ref 3.8–10.5)
WBC # FLD AUTO: 11.5 K/UL — HIGH (ref 3.8–10.5)

## 2024-10-30 PROCEDURE — 99232 SBSQ HOSP IP/OBS MODERATE 35: CPT | Mod: GC

## 2024-10-30 RX ORDER — DIBASIC SODIUM PHOSPHATE, MONOBASIC POTASSIUM PHOSPHATE AND MONOBASIC SODIUM PHOSPHATE 852; 155; 130 MG/1; MG/1; MG/1
1 TABLET ORAL ONCE
Refills: 0 | Status: DISCONTINUED | OUTPATIENT
Start: 2024-10-30 | End: 2024-10-30

## 2024-10-30 RX ORDER — CALCIUM GLUCONATE 98 MG/ML
2 INJECTION, SOLUTION INTRAVENOUS ONCE
Refills: 0 | Status: COMPLETED | OUTPATIENT
Start: 2024-10-30 | End: 2024-10-30

## 2024-10-30 RX ORDER — SODIUM PHOSPHATE, MONOBASIC, MONOHYDRATE AND SODIUM PHOSPHATE, DIBASIC ANHYDROUS 276; 142 MG/ML; MG/ML
15 INJECTION, SOLUTION INTRAVENOUS ONCE
Refills: 0 | Status: COMPLETED | OUTPATIENT
Start: 2024-10-30 | End: 2024-10-30

## 2024-10-30 RX ORDER — SODIUM PHOSPHATE, MONOBASIC, MONOHYDRATE AND SODIUM PHOSPHATE, DIBASIC ANHYDROUS 276; 142 MG/ML; MG/ML
30 INJECTION, SOLUTION INTRAVENOUS ONCE
Refills: 0 | Status: COMPLETED | OUTPATIENT
Start: 2024-10-30 | End: 2024-10-30

## 2024-10-30 RX ORDER — CALCIUM GLUCONATE 98 MG/ML
2 INJECTION, SOLUTION INTRAVENOUS ONCE
Refills: 0 | Status: DISCONTINUED | OUTPATIENT
Start: 2024-10-30 | End: 2024-10-30

## 2024-10-30 RX ADMIN — FENOFIBRATE 145 MILLIGRAM(S): 145 TABLET, FILM COATED ORAL at 13:27

## 2024-10-30 RX ADMIN — OXYCODONE HYDROCHLORIDE 10 MILLIGRAM(S): 30 TABLET ORAL at 09:55

## 2024-10-30 RX ADMIN — Medication 650 MILLIGRAM(S): at 10:55

## 2024-10-30 RX ADMIN — Medication 2 GRAM(S): at 05:13

## 2024-10-30 RX ADMIN — Medication 2 GRAM(S): at 17:23

## 2024-10-30 RX ADMIN — Medication 650 MILLIGRAM(S): at 09:55

## 2024-10-30 RX ADMIN — Medication 40 MILLIGRAM(S): at 13:33

## 2024-10-30 RX ADMIN — OXYCODONE HYDROCHLORIDE 10 MILLIGRAM(S): 30 TABLET ORAL at 18:23

## 2024-10-30 RX ADMIN — OXYCODONE HYDROCHLORIDE 10 MILLIGRAM(S): 30 TABLET ORAL at 01:59

## 2024-10-30 RX ADMIN — Medication 64.8 MILLIGRAM(S): at 13:27

## 2024-10-30 RX ADMIN — SODIUM PHOSPHATE, MONOBASIC, MONOHYDRATE AND SODIUM PHOSPHATE, DIBASIC ANHYDROUS 85 MILLIMOLE(S): 276; 142 INJECTION, SOLUTION INTRAVENOUS at 10:38

## 2024-10-30 RX ADMIN — CALCIUM GLUCONATE 50 GRAM(S): 98 INJECTION, SOLUTION INTRAVENOUS at 01:38

## 2024-10-30 RX ADMIN — Medication 650 MILLIGRAM(S): at 13:55

## 2024-10-30 RX ADMIN — OXYCODONE HYDROCHLORIDE 10 MILLIGRAM(S): 30 TABLET ORAL at 10:55

## 2024-10-30 RX ADMIN — SODIUM PHOSPHATE, MONOBASIC, MONOHYDRATE AND SODIUM PHOSPHATE, DIBASIC ANHYDROUS 63.75 MILLIMOLE(S): 276; 142 INJECTION, SOLUTION INTRAVENOUS at 21:03

## 2024-10-30 RX ADMIN — CALCIUM GLUCONATE 200 GRAM(S): 98 INJECTION, SOLUTION INTRAVENOUS at 17:22

## 2024-10-30 RX ADMIN — Medication 650 MILLIGRAM(S): at 13:27

## 2024-10-30 RX ADMIN — Medication 64.8 MILLIGRAM(S): at 05:13

## 2024-10-30 RX ADMIN — OXYCODONE HYDROCHLORIDE 10 MILLIGRAM(S): 30 TABLET ORAL at 02:29

## 2024-10-30 RX ADMIN — Medication 650 MILLIGRAM(S): at 21:03

## 2024-10-30 RX ADMIN — Medication 650 MILLIGRAM(S): at 21:33

## 2024-10-30 RX ADMIN — OXYCODONE HYDROCHLORIDE 10 MILLIGRAM(S): 30 TABLET ORAL at 17:23

## 2024-10-30 NOTE — PROGRESS NOTE ADULT - SUBJECTIVE AND OBJECTIVE BOX
ENDOCRINE FOLLOW UP     Chief Complaint: abdominal pain since Sunday  Endocrine consulted for hypertriglyceridemia-induced pancreatitis  History: Patient seen and examined on rounds. Pain continues to improve.   TG level under 100 on last two checks    MEDICATIONS  (STANDING):  acetaminophen     Tablet .. 650 milliGRAM(s) Oral every 6 hours  atorvastatin 80 milliGRAM(s) Oral at bedtime  calcium gluconate IVPB 2 Gram(s) IV Intermittent once  enoxaparin Injectable 40 milliGRAM(s) SubCutaneous every 24 hours  fenofibrate Tablet 145 milliGRAM(s) Oral daily  influenza   Vaccine 0.5 milliLiter(s) IntraMuscular once  omega-3-Acid Ethyl Esters 2 Gram(s) Oral two times a day  PHENobarbital   Oral   PHENobarbital 64.8 milliGRAM(s) Oral every 8 hours    MEDICATIONS  (PRN):  oxyCODONE    IR 5 milliGRAM(s) Oral every 6 hours PRN Moderate Pain (4 - 6)  oxyCODONE    IR 10 milliGRAM(s) Oral every 6 hours PRN Severe Pain (7 - 10)      Allergies    No Known Allergies    Intolerances        ROS: All other systems reviewed and negative    PHYSICAL EXAM:  VITALS: T(C): 37.1 (10-30-24 @ 10:39)  T(F): 98.8 (10-30-24 @ 10:39), Max: 99.1 (10-29-24 @ 20:30)  HR: 103 (10-30-24 @ 10:39) (103 - 111)  BP: 116/82 (10-30-24 @ 10:39) (116/82 - 133/86)  RR:  (18 - 19)  SpO2:  (93% - 95%)  Wt(kg): 77.1 kg  GENERAL: NAD, resting comfortably   EYES: No proptosis,  anicteric  HEENT:  Atraumatic, Normocephalic, moist mucous membranes  RESPIRATORY: Nonlabored respirations on room air, normal rate/effort   CARDIOVASCULAR: Regular rate and rhythm; no heave  GI: Soft, nontender, non distended  NEURO: Alert and oriented, moves all extremities spontaneously  PSYCH:  reactive affect, euthymic mood        10-30    127[L]  |  97  |  19  ----------------------------<  136[H]  3.8   |  20[L]  |  1.04    eGFR: 94    Ca    6.7[L]      10-30  Mg     2.6     10-30  Phos  2.0     10-30    TPro  5.4[L]  /  Alb  2.4[L]  /  TBili  4.0[H]  /  DBili  2.9[H]  /  AST  166[H]  /  ALT  89[H]  /  AlkPhos  92  10-30      A1C with Estimated Average Glucose Result: 5.5 % (10-29-24 @ 07:03)      Thyroid Stimulating Hormone, Serum: 3.48 uIU/mL (10-29-24 @ 07:05)

## 2024-10-30 NOTE — PROGRESS NOTE ADULT - ASSESSMENT
38 y M with no significant pmh, presenting with acute onset abdominal pain that woke him up this morning, No associated nausea, vomiting, fevers or chills. Patient reports having 5 drink on the weekend, In the ED, patient was afebrile, VSS, WBC 10. Na 129, T bili 2.4, Alk phos 172, , , lipase 1122, lactate 5.2. CT showed gallbladder sludge, normal CBD, acute interstitial edematous pancreatitis.  Admitted to surgery service for gallstone pancreatitis.     Plan:  Diet: Will remain on CLD  CIWA   Pain control   Monitor UOP  hypocalcemia s/p repletion, f/u Ca  appreciate endocrine       ACS Surgery         38 y M with no significant pmh, presenting with acute onset abdominal pain that woke him up this morning, No associated nausea, vomiting, fevers or chills. Patient reports having 5 drink on the weekend, In the ED, patient was afebrile, VSS, WBC 10. Na 129, T bili 2.4, Alk phos 172, , , lipase 1122, lactate 5.2. CT showed gallbladder sludge, normal CBD, acute interstitial edematous pancreatitis.  Admitted to surgery service for gallstone pancreatitis.     Plan:  Diet: Will remain on CLD  CIWA   Pain control   Monitor UOP  hypocalcemia s/p repletion, f/u Ca  appreciate endocrine   AM labs      ACS Surgery

## 2024-10-30 NOTE — PROVIDER CONTACT NOTE (CRITICAL VALUE NOTIFICATION) - ACTION/TREATMENT ORDERED:
Provider aware and notified. Provider will order STAT repeat labs.
no new interventions at this time. plan of care continues.
PA made aware, no interventions at this time.
Provider aware; Calcium gluconate ordered.

## 2024-10-30 NOTE — PROVIDER CONTACT NOTE (CRITICAL VALUE NOTIFICATION) - SITUATION
Admitted with Acute Pancreatitis without infection or necrosis.
Pt critical value for calcium (5.9).
calcium 5.9
critical of calcium 6.2 and glucose 535

## 2024-10-30 NOTE — PROVIDER CONTACT NOTE (CRITICAL VALUE NOTIFICATION) - ASSESSMENT
Pt tachycardic; all other VSS.
A&OX4, VSS. Pt tolerating clear diet. No SX of muscle twitching/cramping. No cardiac issues JACKY. PT scoring low on SIWA.  PT given iv bolus of LR, fluid maintenance ordered.
calcium 5.9
critical of calcium 6.2 and glucose 535; RN did bedside blood glucose resulted at 148. Patient has no complaints of symptoms at this time. all vitals stable at this time.

## 2024-10-30 NOTE — PROGRESS NOTE ADULT - SUBJECTIVE AND OBJECTIVE BOX
SUBJECTIVE / OVERNIGHT EVENTS:pt seen and examined  10-30-24    MEDICATIONS  (STANDING):  acetaminophen     Tablet .. 650 milliGRAM(s) Oral every 6 hours  atorvastatin 80 milliGRAM(s) Oral at bedtime  calcium gluconate IVPB 2 Gram(s) IV Intermittent once  enoxaparin Injectable 40 milliGRAM(s) SubCutaneous every 24 hours  fenofibrate Tablet 145 milliGRAM(s) Oral daily  influenza   Vaccine 0.5 milliLiter(s) IntraMuscular once  omega-3-Acid Ethyl Esters 2 Gram(s) Oral two times a day  PHENobarbital   Oral     MEDICATIONS  (PRN):  oxyCODONE    IR 10 milliGRAM(s) Oral every 6 hours PRN Severe Pain (7 - 10)  oxyCODONE    IR 5 milliGRAM(s) Oral every 6 hours PRN Moderate Pain (4 - 6)    T(C): 37.1 (10-30-24 @ 13:53), Max: 37.3 (10-29-24 @ 20:30)  HR: 102 (10-30-24 @ 13:53) (102 - 111)  BP: 118/68 (10-30-24 @ 13:53) (116/82 - 133/86)  RR: 18 (10-30-24 @ 13:53) (18 - 19)  SpO2: 93% (10-30-24 @ 13:53) (93% - 95%)    CAPILLARY BLOOD GLUCOSE        I&O's Summary    29 Oct 2024 07:01  -  30 Oct 2024 07:00  --------------------------------------------------------  IN: 5770 mL / OUT: 1125 mL / NET: 4645 mL    30 Oct 2024 07:01  -  30 Oct 2024 14:03  --------------------------------------------------------  IN: 240 mL / OUT: 500 mL / NET: -260 mL        Constitutional: No fever, fatigue  Skin: No rash.  Eyes: No recent vision problems or eye pain.  ENT: No congestion, ear pain, or sore throat.  Cardiovascular: No chest pain or palpation.  Respiratory: No cough, shortness of breath, congestion, or wheezing.  Gastrointestinal: dec  abdominal pain, nausea, vomiting, or diarrhea.  Genitourinary: No dysuria.  Musculoskeletal: No joint swelling.  Neurologic: No headache.    PHYSICAL EXAM:  GENERAL: NAD  EYES: EOMI, PERRLA  NECK: Supple, No JVD  CHEST/LUNG: dec breath sounds at bases  HEART:  S1 , S2 +  ABDOMEN: soft , mild distension+, tenderness on deep palpation+, no guarding, bs+  EXTREMITIES:  no edema  NEUROLOGY:alert awake oriented       LABS:                        11.4   11.50 )-----------( 110      ( 30 Oct 2024 06:32 )             33.1     10-30    127[L]  |  97  |  19  ----------------------------<  136[H]  3.8   |  20[L]  |  1.04    Ca    6.7[L]      30 Oct 2024 06:33  Phos  2.0     10-30  Mg     2.6     10-30    TPro  5.4[L]  /  Alb  2.4[L]  /  TBili  4.0[H]  /  DBili  2.9[H]  /  AST  166[H]  /  ALT  89[H]  /  AlkPhos  92  10-30          Urinalysis Basic - ( 30 Oct 2024 06:33 )    Color: x / Appearance: x / SG: x / pH: x  Gluc: 136 mg/dL / Ketone: x  / Bili: x / Urobili: x   Blood: x / Protein: x / Nitrite: x   Leuk Esterase: x / RBC: x / WBC x   Sq Epi: x / Non Sq Epi: x / Bacteria: x        RADIOLOGY & ADDITIONAL TESTS:    Imaging Personally Reviewed:    Consultant(s) Notes Reviewed:      Care Discussed with Consultants/Other Providers:

## 2024-10-30 NOTE — PROGRESS NOTE ADULT - ASSESSMENT
Patient is a 38 year old male with no significant PMH who presented to the hospital with pancreatitis and hypertriglyceridemia.   Endocrinology consulted for hypertriglyceridemia.    #Hypertriglyceridemia-induced pancreatitis with worrisome features  #Hypocalcemia  #JENNY  #Lactic acidosis  Lipase 1122, CT with acute pancreatitis, TG 1649 on presentation, lactic acidosis  Today, corrected Ca 8.0, mag 2.6, creatinine normalized  TG trend: 1649 (10/27/24 21:46) -> 572 (10/29/24 15:36) -> 741 (10/30/24 04:40)  PLAN:  - Continue medical treatment for hypertriglyceridemia: fenofibrate 145 mg daily, Lovaza 2 g BID.   - Monitor TG level daily  - Low fat diet  - Pancreatitis treatment per primary team  - BID checks for calcium and magnesium, replete to corrected calcium goal of 8.5, Mg goal of 2.0  - Patient will need follow up with [facility] lipid clinic, Dr. Angel Knox [phone] [address], [address], [place] [address]    Pending discussion with attending physician.  INCOMPLETE NOTE  Patient is a 38 year old male with no significant PMH who presented to the hospital with pancreatitis and hypertriglyceridemia.   Endocrinology consulted for hypertriglyceridemia.    #Hypertriglyceridemia-induced pancreatitis with worrisome features  #Hypocalcemia  #JENNY  #Lactic acidosis  Lipase 1122, CT with acute pancreatitis, TG 1649 on presentation, lactic acidosis  Today, corrected Ca 8.0, mag 2.6, creatinine normalized  TG trend: 1649 (10/27/24 21:46) -> 572 (10/29/24 15:36) -> 741 (10/30/24 04:40)  PLAN:  - Continue medical treatment for hypertriglyceridemia: fenofibrate 145 mg daily, Lovaza 2 g BID.   - Monitor TG level daily  - Low fat diet  - Pancreatitis treatment per primary team  - BID checks for calcium and magnesium, replete to corrected calcium goal of 8.5, Mg goal of 2.0  - Patient will need follow up with [facility] lipid clinic, Dr. Angel Knox [phone] [address], [address], [place] [address]  - Extensive counseling provided to patient regarding alcohol cessation including risk of recurrent pancreatitis.     Pending discussion with attending physician.  INCOMPLETE NOTE  Patient is a 38 year old male with no significant PMH who presented to the hospital with pancreatitis and hypertriglyceridemia.   Endocrinology consulted for hypertriglyceridemia.    #Hypertriglyceridemia-induced pancreatitis with worrisome features, resolving  #Hypocalcemia  #JENNY  #Lactic acidosis  Lipase 1122, CT with acute pancreatitis, TG 1649 on presentation, lactic acidosis  Today, corrected Ca 8.0, mag 2.6, creatinine normalized  TG trend: 1649 (10/27/24 21:46) -> 572 (10/29/24 15:36) -> 741 (10/30/24 04:40)  PLAN:  - Continue medical treatment for hypertriglyceridemia: fenofibrate 145 mg daily, Lovaza 2 g BID.   - Monitor TG level daily, please check lipid panel tomorrow AM with direct LDL. If LDL is elevated or other risk factors for ASCVD are present, will consider also continuing the statin.  - Low fat, low sugar diet  - Pancreatitis treatment per primary team  - BID checks for calcium and magnesium, replete to corrected calcium goal of 8.5, Mg goal of 2.0  - Patient will need follow up with [facility] lipid clinic, Dr. Angel Knox [phone] [facility], [address], [place] [address]  - Extensive counseling provided to patient regarding alcohol cessation including risk of recurrent pancreatitis.     Discussed with attending physician.  Trevor Robles DO   PGY-4 Endocrine Fellow  Can be reached via [employer] Teams.    For follow up questions, discharge recommendations or new consults, please email [email or web address] ([email or web address]) or [email or web address] ([facility]) or call the answering service at [phone] (weekdays); [phone] (nights/weekends).   For emergencies, please page fellow on call.

## 2024-10-30 NOTE — PROVIDER CONTACT NOTE (CRITICAL VALUE NOTIFICATION) - BACKGROUND
Pt admitted for acute pancreatitis ([facility]WA).
patient admitted for acute pancreatitis
pt admitted with pancreatitis

## 2024-10-30 NOTE — PROGRESS NOTE ADULT - SUBJECTIVE AND OBJECTIVE BOX
SURGERY DAILY PROGRESS NOTE:       SUBJECTIVE/ROS: Patient seen and evaluated on AM rounds.          OBJECTIVE:    Vital Signs Last 24 Hrs  T(C): 36.9 (30 Oct 2024 05:30), Max: 37.3 (29 Oct 2024 20:30)  T(F): 98.5 (30 Oct 2024 05:30), Max: 99.1 (29 Oct 2024 20:30)  HR: 108 (30 Oct 2024 05:30) (105 - 111)  BP: 133/86 (30 Oct 2024 05:30) (117/84 - 133/86)  BP(mean): --  RR: 19 (30 Oct 2024 05:30) (18 - 19)  SpO2: 95% (30 Oct 2024 05:30) (93% - 95%)    Parameters below as of 30 Oct 2024 05:30  Patient On (Oxygen Delivery Method): room air      I&O's Detail    28 Oct 2024 07:01  -  29 Oct 2024 07:00  --------------------------------------------------------  IN:    IV PiggyBack: 100 mL    Lactated Ringers: 1800 mL    Oral Fluid: 210 mL  Total IN: 2110 mL    OUT:    Voided (mL): 126 mL  Total OUT: 126 mL    Total NET: 1984 mL      29 Oct 2024 07:01  -  30 Oct 2024 06:20  --------------------------------------------------------  IN:    IV PiggyBack: 50 mL    Lactated Ringers: 4000 mL    Lactated Ringers Bolus: 1000 mL    Oral Fluid: 720 mL  Total IN: 5770 mL    OUT:    Voided (mL): 1125 mL  Total OUT: 1125 mL    Total NET: 4645 mL        Daily     Daily   MEDICATIONS  (STANDING):  acetaminophen     Tablet .. 650 milliGRAM(s) Oral every 6 hours  enoxaparin Injectable 40 milliGRAM(s) SubCutaneous every 24 hours  fenofibrate Tablet 145 milliGRAM(s) Oral daily  influenza   Vaccine 0.5 milliLiter(s) IntraMuscular once  lactated ringers. 1000 milliLiter(s) (150 mL/Hr) IV Continuous <Continuous>  omega-3-Acid Ethyl Esters 2 Gram(s) Oral two times a day  PHENobarbital   Oral   PHENobarbital 64.8 milliGRAM(s) Oral every 8 hours    MEDICATIONS  (PRN):  oxyCODONE    IR 5 milliGRAM(s) Oral every 6 hours PRN Moderate Pain (4 - 6)  oxyCODONE    IR 10 milliGRAM(s) Oral every 6 hours PRN Severe Pain (7 - 10)      LABS:                        13.3   10.86 )-----------( 96       ( 29 Oct 2024 07:03 )             39.6     10-30    131[L]  |  97  |  23  ----------------------------<  166[H]  4.1   |  20[L]  |  1.37[H]    Ca    6.7[L]      30 Oct 2024 00:39  Phos  2.9     10-30  Mg     2.6     10-30    TPro  5.4[L]  /  Alb  2.3[L]  /  TBili  3.2[H]  /  DBili  2.4[H]  /  AST  215[H]  /  ALT  115[H]  /  AlkPhos  84  10-29    PT/INR - ( 28 Oct 2024 09:40 )   PT: 11.7 sec;   INR: 1.03 ratio         PTT - ( 28 Oct 2024 09:40 )  PTT:30.2 sec  Urinalysis Basic - ( 30 Oct 2024 00:39 )    Color: x / Appearance: x / SG: x / pH: x  Gluc: 166 mg/dL / Ketone: x  / Bili: x / Urobili: x   Blood: x / Protein: x / Nitrite: x   Leuk Esterase: x / RBC: x / WBC x   Sq Epi: x / Non Sq Epi: x / Bacteria: x              PHYSICAL EXAM:  GENERAL:  Well appearing, in NAD  EYES: conjunctiva clear  ABDOMEN: Soft, non distended, appropriately tender  EXTREMITIES:  2+ Peripheral Pulses, No clubbing, cyanosis, edema.  PSYCH: AAOx3, appropriate affect  SKIN: No rashes or lesions       SURGERY DAILY PROGRESS NOTE:       SUBJECTIVE/ROS: Patient seen and evaluated on AM rounds.         OBJECTIVE:    Vital Signs Last 24 Hrs  T(C): 36.9 (30 Oct 2024 05:30), Max: 37.3 (29 Oct 2024 20:30)  T(F): 98.5 (30 Oct 2024 05:30), Max: 99.1 (29 Oct 2024 20:30)  HR: 108 (30 Oct 2024 05:30) (105 - 111)  BP: 133/86 (30 Oct 2024 05:30) (117/84 - 133/86)  BP(mean): --  RR: 19 (30 Oct 2024 05:30) (18 - 19)  SpO2: 95% (30 Oct 2024 05:30) (93% - 95%)    Parameters below as of 30 Oct 2024 05:30  Patient On (Oxygen Delivery Method): room air      I&O's Detail    28 Oct 2024 07:01  -  29 Oct 2024 07:00  --------------------------------------------------------  IN:    IV PiggyBack: 100 mL    Lactated Ringers: 1800 mL    Oral Fluid: 210 mL  Total IN: 2110 mL    OUT:    Voided (mL): 126 mL  Total OUT: 126 mL    Total NET: 1984 mL      29 Oct 2024 07:01  -  30 Oct 2024 06:20  --------------------------------------------------------  IN:    IV PiggyBack: 50 mL    Lactated Ringers: 4000 mL    Lactated Ringers Bolus: 1000 mL    Oral Fluid: 720 mL  Total IN: 5770 mL    OUT:    Voided (mL): 1125 mL  Total OUT: 1125 mL    Total NET: 4645 mL        Daily     Daily   MEDICATIONS  (STANDING):  acetaminophen     Tablet .. 650 milliGRAM(s) Oral every 6 hours  enoxaparin Injectable 40 milliGRAM(s) SubCutaneous every 24 hours  fenofibrate Tablet 145 milliGRAM(s) Oral daily  influenza   Vaccine 0.5 milliLiter(s) IntraMuscular once  lactated ringers. 1000 milliLiter(s) (150 mL/Hr) IV Continuous <Continuous>  omega-3-Acid Ethyl Esters 2 Gram(s) Oral two times a day  PHENobarbital   Oral   PHENobarbital 64.8 milliGRAM(s) Oral every 8 hours    MEDICATIONS  (PRN):  oxyCODONE    IR 5 milliGRAM(s) Oral every 6 hours PRN Moderate Pain (4 - 6)  oxyCODONE    IR 10 milliGRAM(s) Oral every 6 hours PRN Severe Pain (7 - 10)      LABS:                        13.3   10.86 )-----------( 96       ( 29 Oct 2024 07:03 )             39.6     10-30    131[L]  |  97  |  23  ----------------------------<  166[H]  4.1   |  20[L]  |  1.37[H]    Ca    6.7[L]      30 Oct 2024 00:39  Phos  2.9     10-30  Mg     2.6     10-30    TPro  5.4[L]  /  Alb  2.3[L]  /  TBili  3.2[H]  /  DBili  2.4[H]  /  AST  215[H]  /  ALT  115[H]  /  AlkPhos  84  10-29    PT/INR - ( 28 Oct 2024 09:40 )   PT: 11.7 sec;   INR: 1.03 ratio         PTT - ( 28 Oct 2024 09:40 )  PTT:30.2 sec  Urinalysis Basic - ( 30 Oct 2024 00:39 )    Color: x / Appearance: x / SG: x / pH: x  Gluc: 166 mg/dL / Ketone: x  / Bili: x / Urobili: x   Blood: x / Protein: x / Nitrite: x   Leuk Esterase: x / RBC: x / WBC x   Sq Epi: x / Non Sq Epi: x / Bacteria: x              PHYSICAL EXAM:  GENERAL:  Well appearing, in NAD  EYES: conjunctiva clear  ABDOMEN: Soft, non distended, appropriately tender  EXTREMITIES:  2+ Peripheral Pulses, No clubbing, cyanosis, edema.  PSYCH: AAOx3, appropriate affect  SKIN: No rashes or lesions

## 2024-10-31 LAB
ALBUMIN SERPL ELPH-MCNC: 2.5 G/DL — LOW (ref 3.3–5)
ALP SERPL-CCNC: 98 U/L — SIGNIFICANT CHANGE UP (ref 40–120)
ALT FLD-CCNC: 77 U/L — HIGH (ref 10–45)
ANION GAP SERPL CALC-SCNC: 11 MMOL/L — SIGNIFICANT CHANGE UP (ref 5–17)
AST SERPL-CCNC: 134 U/L — HIGH (ref 10–40)
BILIRUB DIRECT SERPL-MCNC: 3.1 MG/DL — HIGH (ref 0–0.3)
BILIRUB INDIRECT FLD-MCNC: 0.7 MG/DL — SIGNIFICANT CHANGE UP (ref 0.2–1)
BILIRUB SERPL-MCNC: 3.8 MG/DL — HIGH (ref 0.2–1.2)
BUN SERPL-MCNC: 11 MG/DL — SIGNIFICANT CHANGE UP (ref 7–23)
BUN SERPL-MCNC: 13 MG/DL — SIGNIFICANT CHANGE UP (ref 7–23)
BUN SERPL-MCNC: 9 MG/DL — SIGNIFICANT CHANGE UP (ref 7–23)
CA-I BLD-SCNC: 0.94 MMOL/L — LOW (ref 1.15–1.33)
CA-I BLD-SCNC: 1.02 MMOL/L — LOW (ref 1.15–1.33)
CA-I BLD-SCNC: 1.08 MMOL/L — LOW (ref 1.15–1.33)
CALCIUM SERPL-MCNC: 6.7 MG/DL — LOW (ref 8.4–10.5)
CALCIUM SERPL-MCNC: 7.1 MG/DL — LOW (ref 8.4–10.5)
CALCIUM SERPL-MCNC: 7.6 MG/DL — LOW (ref 8.4–10.5)
CHLORIDE SERPL-SCNC: 96 MMOL/L — SIGNIFICANT CHANGE UP (ref 96–108)
CHLORIDE SERPL-SCNC: 96 MMOL/L — SIGNIFICANT CHANGE UP (ref 96–108)
CHLORIDE SERPL-SCNC: 98 MMOL/L — SIGNIFICANT CHANGE UP (ref 96–108)
CHOLEST SERPL-MCNC: 141 MG/DL — SIGNIFICANT CHANGE UP
CO2 SERPL-SCNC: 22 MMOL/L — SIGNIFICANT CHANGE UP (ref 22–31)
CO2 SERPL-SCNC: 22 MMOL/L — SIGNIFICANT CHANGE UP (ref 22–31)
CO2 SERPL-SCNC: 25 MMOL/L — SIGNIFICANT CHANGE UP (ref 22–31)
CREAT ?TM UR-MCNC: 75 MG/DL — SIGNIFICANT CHANGE UP
CREAT SERPL-MCNC: 0.7 MG/DL — SIGNIFICANT CHANGE UP (ref 0.5–1.3)
CREAT SERPL-MCNC: 0.73 MG/DL — SIGNIFICANT CHANGE UP (ref 0.5–1.3)
CREAT SERPL-MCNC: 0.77 MG/DL — SIGNIFICANT CHANGE UP (ref 0.5–1.3)
EGFR: 118 ML/MIN/1.73M2 — SIGNIFICANT CHANGE UP
EGFR: 119 ML/MIN/1.73M2 — SIGNIFICANT CHANGE UP
EGFR: 121 ML/MIN/1.73M2 — SIGNIFICANT CHANGE UP
GLUCOSE SERPL-MCNC: 105 MG/DL — HIGH (ref 70–99)
GLUCOSE SERPL-MCNC: 125 MG/DL — HIGH (ref 70–99)
GLUCOSE SERPL-MCNC: 142 MG/DL — HIGH (ref 70–99)
HCT VFR BLD CALC: 29.1 % — LOW (ref 39–50)
HDLC SERPL-MCNC: 12 MG/DL — LOW
HGB BLD-MCNC: 10.1 G/DL — LOW (ref 13–17)
LDLC SERPL DIRECT ASSAY-MCNC: 28 MG/DL — SIGNIFICANT CHANGE UP
LIPID PNL WITH DIRECT LDL SERPL: 77 MG/DL — SIGNIFICANT CHANGE UP
MAGNESIUM SERPL-MCNC: 2.5 MG/DL — SIGNIFICANT CHANGE UP (ref 1.6–2.6)
MCHC RBC-ENTMCNC: 27.4 PG — SIGNIFICANT CHANGE UP (ref 27–34)
MCHC RBC-ENTMCNC: 34.7 G/DL — SIGNIFICANT CHANGE UP (ref 32–36)
MCV RBC AUTO: 78.9 FL — LOW (ref 80–100)
NON HDL CHOLESTEROL: 129 MG/DL — SIGNIFICANT CHANGE UP
NRBC # BLD: 0 /100 WBCS — SIGNIFICANT CHANGE UP (ref 0–0)
OSMOLALITY UR: 343 MOS/KG — SIGNIFICANT CHANGE UP (ref 300–900)
PHOSPHATE SERPL-MCNC: 1.8 MG/DL — LOW (ref 2.5–4.5)
PHOSPHATE SERPL-MCNC: 2.2 MG/DL — LOW (ref 2.5–4.5)
PHOSPHATE SERPL-MCNC: 2.7 MG/DL — SIGNIFICANT CHANGE UP (ref 2.5–4.5)
PLATELET # BLD AUTO: 128 K/UL — LOW (ref 150–400)
POTASSIUM SERPL-MCNC: 3.4 MMOL/L — LOW (ref 3.5–5.3)
POTASSIUM SERPL-MCNC: 3.5 MMOL/L — SIGNIFICANT CHANGE UP (ref 3.5–5.3)
POTASSIUM SERPL-MCNC: 3.6 MMOL/L — SIGNIFICANT CHANGE UP (ref 3.5–5.3)
POTASSIUM SERPL-SCNC: 3.4 MMOL/L — LOW (ref 3.5–5.3)
POTASSIUM SERPL-SCNC: 3.5 MMOL/L — SIGNIFICANT CHANGE UP (ref 3.5–5.3)
POTASSIUM SERPL-SCNC: 3.6 MMOL/L — SIGNIFICANT CHANGE UP (ref 3.5–5.3)
PROT SERPL-MCNC: 5.5 G/DL — LOW (ref 6–8.3)
RBC # BLD: 3.69 M/UL — LOW (ref 4.2–5.8)
RBC # FLD: 16.8 % — HIGH (ref 10.3–14.5)
SODIUM SERPL-SCNC: 129 MMOL/L — LOW (ref 135–145)
SODIUM SERPL-SCNC: 131 MMOL/L — LOW (ref 135–145)
SODIUM SERPL-SCNC: 132 MMOL/L — LOW (ref 135–145)
SODIUM UR-SCNC: 5 MMOL/L — SIGNIFICANT CHANGE UP
TRIGL SERPL-MCNC: 317 MG/DL — HIGH
WBC # BLD: 12.44 K/UL — HIGH (ref 3.8–10.5)
WBC # FLD AUTO: 12.44 K/UL — HIGH (ref 3.8–10.5)

## 2024-10-31 PROCEDURE — 74183 MRI ABD W/O CNTR FLWD CNTR: CPT | Mod: 26

## 2024-10-31 PROCEDURE — 99232 SBSQ HOSP IP/OBS MODERATE 35: CPT | Mod: GC

## 2024-10-31 PROCEDURE — 99232 SBSQ HOSP IP/OBS MODERATE 35: CPT

## 2024-10-31 RX ORDER — POTASSIUM CHLORIDE 10 MEQ
10 TABLET, EXTENDED RELEASE ORAL
Refills: 0 | Status: DISCONTINUED | OUTPATIENT
Start: 2024-10-31 | End: 2024-10-31

## 2024-10-31 RX ORDER — CALCIUM GLUCONATE 98 MG/ML
2 INJECTION, SOLUTION INTRAVENOUS ONCE
Refills: 0 | Status: COMPLETED | OUTPATIENT
Start: 2024-10-31 | End: 2024-10-31

## 2024-10-31 RX ORDER — DIBASIC SODIUM PHOSPHATE, MONOBASIC POTASSIUM PHOSPHATE AND MONOBASIC SODIUM PHOSPHATE 852; 155; 130 MG/1; MG/1; MG/1
1 TABLET ORAL ONCE
Refills: 0 | Status: COMPLETED | OUTPATIENT
Start: 2024-10-31 | End: 2024-10-31

## 2024-10-31 RX ORDER — POTASSIUM CHLORIDE 10 MEQ
40 TABLET, EXTENDED RELEASE ORAL ONCE
Refills: 0 | Status: COMPLETED | OUTPATIENT
Start: 2024-10-31 | End: 2024-10-31

## 2024-10-31 RX ORDER — SODIUM PHOSPHATE, MONOBASIC, MONOHYDRATE AND SODIUM PHOSPHATE, DIBASIC ANHYDROUS 276; 142 MG/ML; MG/ML
30 INJECTION, SOLUTION INTRAVENOUS ONCE
Refills: 0 | Status: COMPLETED | OUTPATIENT
Start: 2024-10-31 | End: 2024-10-31

## 2024-10-31 RX ORDER — SODIUM PHOSPHATE, MONOBASIC, MONOHYDRATE AND SODIUM PHOSPHATE, DIBASIC ANHYDROUS 276; 142 MG/ML; MG/ML
15 INJECTION, SOLUTION INTRAVENOUS ONCE
Refills: 0 | Status: COMPLETED | OUTPATIENT
Start: 2024-10-31 | End: 2024-10-31

## 2024-10-31 RX ORDER — SODIUM PHOSPHATE, MONOBASIC, MONOHYDRATE AND SODIUM PHOSPHATE, DIBASIC ANHYDROUS 276; 142 MG/ML; MG/ML
15 INJECTION, SOLUTION INTRAVENOUS ONCE
Refills: 0 | Status: DISCONTINUED | OUTPATIENT
Start: 2024-10-31 | End: 2024-10-31

## 2024-10-31 RX ORDER — SODIUM CHLORIDE, SODIUM GLUCONATE, SODIUM ACETATE, POTASSIUM CHLORIDE AND MAGNESIUM CHLORIDE 30; 37; 368; 526; 502 MG/100ML; MG/100ML; MG/100ML; MG/100ML; MG/100ML
1000 INJECTION, SOLUTION INTRAVENOUS
Refills: 0 | Status: DISCONTINUED | OUTPATIENT
Start: 2024-10-31 | End: 2024-10-31

## 2024-10-31 RX ADMIN — Medication 64.8 MILLIGRAM(S): at 05:19

## 2024-10-31 RX ADMIN — CALCIUM GLUCONATE 200 GRAM(S): 98 INJECTION, SOLUTION INTRAVENOUS at 12:03

## 2024-10-31 RX ADMIN — CALCIUM GLUCONATE 200 GRAM(S): 98 INJECTION, SOLUTION INTRAVENOUS at 20:22

## 2024-10-31 RX ADMIN — OXYCODONE HYDROCHLORIDE 10 MILLIGRAM(S): 30 TABLET ORAL at 11:46

## 2024-10-31 RX ADMIN — OXYCODONE HYDROCHLORIDE 10 MILLIGRAM(S): 30 TABLET ORAL at 20:28

## 2024-10-31 RX ADMIN — OXYCODONE HYDROCHLORIDE 10 MILLIGRAM(S): 30 TABLET ORAL at 08:00

## 2024-10-31 RX ADMIN — Medication 650 MILLIGRAM(S): at 02:00

## 2024-10-31 RX ADMIN — Medication 650 MILLIGRAM(S): at 20:06

## 2024-10-31 RX ADMIN — Medication 2 GRAM(S): at 17:43

## 2024-10-31 RX ADMIN — SODIUM PHOSPHATE, MONOBASIC, MONOHYDRATE AND SODIUM PHOSPHATE, DIBASIC ANHYDROUS 63.75 MILLIMOLE(S): 276; 142 INJECTION, SOLUTION INTRAVENOUS at 02:54

## 2024-10-31 RX ADMIN — Medication 64.8 MILLIGRAM(S): at 17:43

## 2024-10-31 RX ADMIN — CALCIUM GLUCONATE 50 GRAM(S): 98 INJECTION, SOLUTION INTRAVENOUS at 03:05

## 2024-10-31 RX ADMIN — OXYCODONE HYDROCHLORIDE 10 MILLIGRAM(S): 30 TABLET ORAL at 20:58

## 2024-10-31 RX ADMIN — Medication 650 MILLIGRAM(S): at 02:30

## 2024-10-31 RX ADMIN — Medication 650 MILLIGRAM(S): at 08:00

## 2024-10-31 RX ADMIN — OXYCODONE HYDROCHLORIDE 10 MILLIGRAM(S): 30 TABLET ORAL at 14:24

## 2024-10-31 RX ADMIN — OXYCODONE HYDROCHLORIDE 10 MILLIGRAM(S): 30 TABLET ORAL at 01:26

## 2024-10-31 RX ADMIN — Medication 40 MILLIGRAM(S): at 13:18

## 2024-10-31 RX ADMIN — Medication 650 MILLIGRAM(S): at 13:18

## 2024-10-31 RX ADMIN — Medication 650 MILLIGRAM(S): at 20:36

## 2024-10-31 RX ADMIN — SODIUM PHOSPHATE, MONOBASIC, MONOHYDRATE AND SODIUM PHOSPHATE, DIBASIC ANHYDROUS 85 MILLIMOLE(S): 276; 142 INJECTION, SOLUTION INTRAVENOUS at 13:15

## 2024-10-31 RX ADMIN — Medication 40 MILLIEQUIVALENT(S): at 20:06

## 2024-10-31 RX ADMIN — SODIUM CHLORIDE, SODIUM GLUCONATE, SODIUM ACETATE, POTASSIUM CHLORIDE AND MAGNESIUM CHLORIDE 110 MILLILITER(S): 30; 37; 368; 526; 502 INJECTION, SOLUTION INTRAVENOUS at 13:15

## 2024-10-31 RX ADMIN — DIBASIC SODIUM PHOSPHATE, MONOBASIC POTASSIUM PHOSPHATE AND MONOBASIC SODIUM PHOSPHATE 1 TABLET(S): 852; 155; 130 TABLET ORAL at 02:54

## 2024-10-31 RX ADMIN — FENOFIBRATE 145 MILLIGRAM(S): 145 TABLET, FILM COATED ORAL at 13:17

## 2024-10-31 RX ADMIN — Medication 2 GRAM(S): at 05:19

## 2024-10-31 RX ADMIN — OXYCODONE HYDROCHLORIDE 10 MILLIGRAM(S): 30 TABLET ORAL at 11:45

## 2024-10-31 RX ADMIN — Medication 100 MILLIEQUIVALENT(S): at 13:20

## 2024-10-31 NOTE — PROGRESS NOTE ADULT - SUBJECTIVE AND OBJECTIVE BOX
ENDOCRINE FOLLOW UP     Chief Complaint:   Endocrine consulted for   History:     MEDICATIONS  (STANDING):  acetaminophen     Tablet .. 650 milliGRAM(s) Oral every 6 hours  dextrose 5% + sodium chloride 0.45% with potassium chloride 20 mEq/L 1000 milliLiter(s) (110 mL/Hr) IV Continuous <Continuous>  enoxaparin Injectable 40 milliGRAM(s) SubCutaneous every 24 hours  fenofibrate Tablet 145 milliGRAM(s) Oral daily  influenza   Vaccine 0.5 milliLiter(s) IntraMuscular once  omega-3-Acid Ethyl Esters 2 Gram(s) Oral two times a day  PHENobarbital 64.8 milliGRAM(s) Oral every 12 hours  PHENobarbital   Oral   potassium chloride  10 mEq/100 mL IVPB 10 milliEquivalent(s) IV Intermittent every 1 hour    MEDICATIONS  (PRN):  oxyCODONE    IR 10 milliGRAM(s) Oral every 6 hours PRN Severe Pain (7 - 10)  oxyCODONE    IR 5 milliGRAM(s) Oral every 6 hours PRN Moderate Pain (4 - 6)      Allergies    No Known Allergies    Intolerances        ROS: All other systems reviewed and negative    PHYSICAL EXAM:  VITALS: T(C): 37 (10-31-24 @ 12:46)  T(F): 98.6 (10-31-24 @ 12:46), Max: 99.1 (10-30-24 @ 20:35)  HR: 96 (10-31-24 @ 12:46) (96 - 122)  BP: 146/93 (10-31-24 @ 12:46) (108/76 - 146/93)  RR:  (18 - 18)  SpO2:  (93% - 95%)  Wt(kg): --  GENERAL: NAD, resting comfortably   EYES: No proptosis,  anicteric  HEENT:  Atraumatic, Normocephalic, moist mucous membranes  RESPIRATORY: Nonlabored respirations on room air, normal rate/effort   CARDIOVASCULAR: Regular rate and rhythm; no heave  GI: Soft, nontender, non distended  NEURO: Alert and oriented, moves all extremities spontaneously  PSYCH:  reactive affect, euthymic mood    POCT Blood Glucose.: 148 mg/dL (10-30-24 @ 17:21)      10-31    131[L]  |  98  |  11  ----------------------------<  125[H]  3.5   |  22  |  0.73    eGFR: 119    Ca    7.1[L]      10-31  Mg     2.5     10-31  Phos  2.7     10-31    TPro  5.5[L]  /  Alb  2.5[L]  /  TBili  3.8[H]  /  DBili  3.1[H]  /  AST  134[H]  /  ALT  77[H]  /  AlkPhos  98  10-31      A1C with Estimated Average Glucose Result: 5.5 % (10-29-24 @ 07:03)      Thyroid Stimulating Hormone, Serum: 3.48 uIU/mL (10-29-24 @ 07:05)   ENDOCRINE FOLLOW UP     Chief Complaint: abdominal pain  Endocrine consulted for triglyceride-induced hyperglycemia  History: Patient seen and examined on rounds. Symptoms continue to improve. TG down to 317 this morning. Corrected calcium up to 8.3. Direct LDL 28.   Patient complains today of some increased swelling which he attributes to the IV fluids he has been getting to treat his pancreatitis.     MEDICATIONS  (STANDING):  acetaminophen     Tablet .. 650 milliGRAM(s) Oral every 6 hours  dextrose 5% + sodium chloride 0.45% with potassium chloride 20 mEq/L 1000 milliLiter(s) (110 mL/Hr) IV Continuous <Continuous>  enoxaparin Injectable 40 milliGRAM(s) SubCutaneous every 24 hours  fenofibrate Tablet 145 milliGRAM(s) Oral daily  influenza   Vaccine 0.5 milliLiter(s) IntraMuscular once  omega-3-Acid Ethyl Esters 2 Gram(s) Oral two times a day  PHENobarbital 64.8 milliGRAM(s) Oral every 12 hours  PHENobarbital   Oral   potassium chloride  10 mEq/100 mL IVPB 10 milliEquivalent(s) IV Intermittent every 1 hour    MEDICATIONS  (PRN):  oxyCODONE    IR 10 milliGRAM(s) Oral every 6 hours PRN Severe Pain (7 - 10)  oxyCODONE    IR 5 milliGRAM(s) Oral every 6 hours PRN Moderate Pain (4 - 6)      Allergies    No Known Allergies    Intolerances        ROS: All other systems reviewed and negative    PHYSICAL EXAM:  VITALS: T(C): 37 (10-31-24 @ 12:46)  T(F): 98.6 (10-31-24 @ 12:46), Max: 99.1 (10-30-24 @ 20:35)  HR: 96 (10-31-24 @ 12:46) (96 - 122)  BP: 146/93 (10-31-24 @ 12:46) (108/76 - 146/93)  RR:  (18 - 18)  SpO2:  (93% - 95%)  Wt(kg): 77.1 kg  GENERAL: NAD, resting comfortably   EYES: No proptosis,  anicteric  HEENT:  Atraumatic, Normocephalic, moist mucous membranes  RESPIRATORY: Nonlabored respirations on room air, normal rate/effort   CARDIOVASCULAR: Regular rate and rhythm; no heave  GI: Soft, nontender, non distended  NEURO: Alert and oriented, moves all extremities spontaneously  PSYCH:  reactive affect, euthymic mood    POCT Blood Glucose.: 148 mg/dL (10-30-24 @ 17:21)      10-31    131[L]  |  98  |  11  ----------------------------<  125[H]  3.5   |  22  |  0.73    eGFR: 119    Ca    7.1[L]      10-31  Mg     2.5     10-31  Phos  2.7     10-31    TPro  5.5[L]  /  Alb  2.5[L]  /  TBili  3.8[H]  /  DBili  3.1[H]  /  AST  134[H]  /  ALT  77[H]  /  AlkPhos  98  10-31      A1C with Estimated Average Glucose Result: 5.5 % (10-29-24 @ 07:03)      Thyroid Stimulating Hormone, Serum: 3.48 uIU/mL (10-29-24 @ 07:05)

## 2024-10-31 NOTE — PROGRESS NOTE ADULT - ASSESSMENT
38 y M with no significant pmh, presenting with acute onset abdominal pain that woke him up this morning, No associated nausea, vomiting, fevers or chills. Patient reports having 5 drink on the weekend, In the ED, patient was afebrile, VSS, WBC 10. Na 129, T bili 2.4, Alk phos 172, , , lipase 1122, lactate 5.2. CT showed gallbladder sludge, normal CBD, acute interstitial edematous pancreatitis.  Admitted to surgery service for gallstone pancreatitis. Pending MRCP to determine future plans.     Plan:  - Diet: NPO for MRCP   - Phenobarb taper   - Pain control PRN   - IVF while NPO  - Monitor UOP  - Replete electrolytes PRN   - supportive underwear for scrotal edema     Ata Venegas, PGY1  General Surgery  Trauma/ACS [id number]

## 2024-10-31 NOTE — PROGRESS NOTE ADULT - ASSESSMENT
Patient is a 38 year old male with no significant PMH who presented to the hospital with pancreatitis and hypertriglyceridemia.   Endocrinology consulted for hypertriglyceridemia.    #Hypertriglyceridemia-induced pancreatitis with worrisome features, resolving  #Hypocalcemia  #JENNY  #Lactic acidosis  Lipase 1122, CT with acute pancreatitis, TG 1649 on presentation, lactic acidosis  Today, corrected Ca 8.3  TG trend: 1649 (10/27/24 21:46) -> 572 (10/29/24 15:36) -> 741 (10/30/24 04:40)  PLAN:  - Continue medical treatment for hypertriglyceridemia: fenofibrate 145 mg daily, Lovaza 2 g BID.   -   - Low fat, low sugar diet  - Pancreatitis treatment per primary team  - BID checks for calcium and magnesium, replete to corrected calcium goal of 8.5, Mg goal of 2.0  - Patient will need follow up with [facility] lipid clinic, Dr. Angel Knox [phone] [address], [address], [place] [address]  - Extensive counseling provided to patient regarding alcohol cessation including risk of recurrent pancreatitis.     Pending discussion with attending physician.  INCOMPLETE NOTE  Patient is a 38 year old male with no significant PMH who presented to the hospital with pancreatitis and hypertriglyceridemia.   Endocrinology consulted for hypertriglyceridemia.    #Hypertriglyceridemia-induced pancreatitis with worrisome features, resolving  #Hypocalcemia  #JENNY, resolved  #Lactic acidosis, resolved  Lipase 1122, CT with acute pancreatitis, TG 1649 on presentation, lactic acidosis  Today, corrected Ca 8.3  TG trend: 1649 (10/27/24 21:46) -> 572 (10/29/24 15:36) -> 741 (10/30/24 04:40) -> 317 (10/31/24 06:45)  PLAN:  - Continue medical treatment for hypertriglyceridemia: fenofibrate 145 mg daily, Lovaza 2 g BID. These medications should be continued upon discharge.   - Low fat, low sugar diet  - Pancreatitis treatment per primary team  - replete to corrected calcium goal of 8.5, Mg goal of 2.0  - Patient will need follow up with [facility] lipid clinic, Dr. Angel Knox [phone] [address] [place] [address], [address], [place] [address]  - Extensive counseling provided to patient regarding alcohol cessation including risk of recurrent pancreatitis.     Discussed with attending physician.  Trevor Robles DO   PGY-4 Endocrine Fellow  Can be reached via [employer] Teams.    For follow up questions, discharge recommendations or new consults, please email [email or web address] or [email or web address] ([email or web address]) or call the answering service at [phone] (weekdays); [phone] (nights/weekends).   For emergencies, please page fellow on call.

## 2024-10-31 NOTE — PROGRESS NOTE ADULT - SUBJECTIVE AND OBJECTIVE BOX
Subjective:  Patient seen at bedside this AM. Reports feeling better, pain is much improved. Only complaint is scrotal swelling. Denies chest pain, SOB. Tolerating low fat diet without nausea, vomiting.     24h Events:   - Overnight, patient noticed new scrotal swelling. On exam overnight it appeared to be dependent edema from intense IVF resuscitation.     Objective:  Vital Signs  T(C): 36.9 (10-31 @ 08:43), Max: 37.3 (10-30 @ 20:35)  HR: 98 (10-31 @ 08:43) (97 - 122)  BP: 116/82 (10-31 @ 08:43) (108/76 - 133/89)  RR: 18 (10-31 @ 08:43) (18 - 18)  SpO2: 95% (10-31 @ 08:43) (93% - 95%)  10-30-24 @ 07:01  -  10-31-24 @ 07:00  --------------------------------------------------------  IN:  Total IN: 0 mL    OUT:    Voided (mL): 1200 mL  Total OUT: 1200 mL    Total NET: -1200 mL          Physical Exam:  GEN: resting in bed comfortably in NAD  RESP: no increased WOB, no tachypnea, on RA  ABD: soft, non-distended abdomen. Mild tenderness to palpation in epigastrium. No rebound, guarding or rigidity.   Groin: Scrotum edematous, no skin changes or warmth.   EXTR: warm, well-perfused without gross deformities; spontaneous movement in b/l U/L extrem  NEURO: awake, alert    Labs:                        10.1   12.44 )-----------( 128      ( 31 Oct 2024 06:35 )             29.1   10-31    131[L]  |  98  |  11  ----------------------------<  125[H]  3.5   |  22  |  0.73    Ca    7.1[L]      31 Oct 2024 06:35  Phos  2.7     10-31  Mg     2.5     10-31    TPro  5.5[L]  /  Alb  2.5[L]  /  TBili  3.8[H]  /  DBili  3.1[H]  /  AST  134[H]  /  ALT  77[H]  /  AlkPhos  98  10-31    CAPILLARY BLOOD GLUCOSE      POCT Blood Glucose.: 148 mg/dL (30 Oct 2024 17:21)      Medications:  MEDICATIONS  (STANDING):  acetaminophen     Tablet .. 650 milliGRAM(s) Oral every 6 hours  calcium gluconate IVPB 2 Gram(s) IV Intermittent once  enoxaparin Injectable 40 milliGRAM(s) SubCutaneous every 24 hours  fenofibrate Tablet 145 milliGRAM(s) Oral daily  influenza   Vaccine 0.5 milliLiter(s) IntraMuscular once  omega-3-Acid Ethyl Esters 2 Gram(s) Oral two times a day  PHENobarbital   Oral   PHENobarbital 64.8 milliGRAM(s) Oral every 12 hours  potassium chloride  10 mEq/100 mL IVPB 10 milliEquivalent(s) IV Intermittent every 1 hour  sodium phosphate 30 milliMole(s)/500 mL IVPB 30 milliMole(s) IV Intermittent once    MEDICATIONS  (PRN):  oxyCODONE    IR 5 milliGRAM(s) Oral every 6 hours PRN Moderate Pain (4 - 6)  oxyCODONE    IR 10 milliGRAM(s) Oral every 6 hours PRN Severe Pain (7 - 10)      Imaging:

## 2024-10-31 NOTE — PROGRESS NOTE ADULT - SUBJECTIVE AND OBJECTIVE BOX
SUBJECTIVE / OVERNIGHT EVENTS:pt seen and examined  10-31-24    MEDICATIONS  (STANDING):  acetaminophen     Tablet .. 650 milliGRAM(s) Oral every 6 hours  enoxaparin Injectable 40 milliGRAM(s) SubCutaneous every 24 hours  fenofibrate Tablet 145 milliGRAM(s) Oral daily  influenza   Vaccine 0.5 milliLiter(s) IntraMuscular once  omega-3-Acid Ethyl Esters 2 Gram(s) Oral two times a day  PHENobarbital   Oral     MEDICATIONS  (PRN):  oxyCODONE    IR 10 milliGRAM(s) Oral every 6 hours PRN Severe Pain (7 - 10)  oxyCODONE    IR 5 milliGRAM(s) Oral every 6 hours PRN Moderate Pain (4 - 6)    Vital Signs Last 24 Hrs  T(C): 37.3 (10-31-24 @ 21:48), Max: 37.3 (10-31-24 @ 21:48)  T(F): 99.2 (10-31-24 @ 21:48), Max: 99.2 (10-31-24 @ 21:48)  HR: 102 (10-31-24 @ 21:48) (96 - 103)  BP: 133/86 (10-31-24 @ 21:48) (112/80 - 146/93)  BP(mean): --  RR: 18 (10-31-24 @ 21:48) (18 - 18)  SpO2: 95% (10-31-24 @ 21:48) (93% - 95%)      Constitutional: No fever, fatigue  Skin: No rash.  Eyes: No recent vision problems or eye pain.  ENT: No congestion, ear pain, or sore throat.  Cardiovascular: No chest pain or palpation.  Respiratory: No cough, shortness of breath, congestion, or wheezing.  Gastrointestinal: dec  abdominal pain, nausea, vomiting, or diarrhea.  Genitourinary: No dysuria.  Musculoskeletal: No joint swelling.  Neurologic: No headache.    PHYSICAL EXAM:  GENERAL: NAD  EYES: EOMI, PERRLA  NECK: Supple, No JVD  CHEST/LUNG: dec breath sounds at bases  HEART:  S1 , S2 +  ABDOMEN: soft , mild distension+, tenderness on deep palpation+, no guarding, bs+  EXTREMITIES:  no edema  NEUROLOGY:alert awake oriented     LABS:  10-31    132[L]  |  96  |  9   ----------------------------<  105[H]  3.6   |  25  |  0.70    Ca    7.6[L]      31 Oct 2024 17:22  Phos  1.8     10-31  Mg     2.5     10-31    TPro  5.5[L]  /  Alb  2.5[L]  /  TBili  3.8[H]  /  DBili  3.1[H]  /  AST  134[H]  /  ALT  77[H]  /  AlkPhos  98  10-31    Creatinine Trend: 0.70 <--, 0.73 <--, 0.77 <--, 0.81 <--, 0.79 <--, 1.04 <--, 1.37 <--, 1.74 <--, 2.46 <--, 2.43 <--, 1.42 <--, 1.31 <--, 1.17 <--                        10.1   12.44 )-----------( 128      ( 31 Oct 2024 06:35 )             29.1     Urine Studies:  Urinalysis Basic - ( 31 Oct 2024 17:22 )    Color:  / Appearance:  / SG:  / pH:   Gluc: 105 mg/dL / Ketone:   / Bili:  / Urobili:    Blood:  / Protein:  / Nitrite:    Leuk Esterase:  / RBC:  / WBC    Sq Epi:  / Non Sq Epi:  / Bacteria:       Osmolality, Random Urine: 343 mos/kg (10-31 @ 06:38)  Sodium, Random Urine: 5 mmol/L (10-31 @ 06:38)  Creatinine, Random Urine: 75 mg/dL (10-31 @ 06:38)  Sodium, Random Urine: 10 mmol/L (10-29 @ 03:39)  Osmolality, Random Urine: 385 mos/kg (10-29 @ 03:39)  Creatinine, Random Urine: 218 mg/dL (10-29 @ 03:39)          LIVER FUNCTIONS - ( 31 Oct 2024 06:35 )  Alb: 2.5 g/dL / Pro: 5.5 g/dL / ALK PHOS: 98 U/L / ALT: 77 U/L / AST: 134 U/L / GGT: x                   RADIOLOGY & ADDITIONAL TESTS:    Imaging Personally Reviewed:    Consultant(s) Notes Reviewed:      Care Discussed with Consultants/Other Providers:

## 2024-11-01 LAB
ALBUMIN SERPL ELPH-MCNC: 2.5 G/DL — LOW (ref 3.3–5)
ALP SERPL-CCNC: 142 U/L — HIGH (ref 40–120)
ALT FLD-CCNC: 112 U/L — HIGH (ref 10–45)
ANION GAP SERPL CALC-SCNC: 13 MMOL/L — SIGNIFICANT CHANGE UP (ref 5–17)
ANION GAP SERPL CALC-SCNC: 9 MMOL/L — SIGNIFICANT CHANGE UP (ref 5–17)
AST SERPL-CCNC: 232 U/L — HIGH (ref 10–40)
BILIRUB DIRECT SERPL-MCNC: 3.3 MG/DL — HIGH (ref 0–0.3)
BILIRUB INDIRECT FLD-MCNC: 0.8 MG/DL — SIGNIFICANT CHANGE UP (ref 0.2–1)
BILIRUB SERPL-MCNC: 4.1 MG/DL — HIGH (ref 0.2–1.2)
BUN SERPL-MCNC: 8 MG/DL — SIGNIFICANT CHANGE UP (ref 7–23)
BUN SERPL-MCNC: 8 MG/DL — SIGNIFICANT CHANGE UP (ref 7–23)
CA-I BLD-SCNC: 1.11 MMOL/L — LOW (ref 1.15–1.33)
CA-I BLD-SCNC: SIGNIFICANT CHANGE UP MMOL/L (ref 1.15–1.33)
CALCIUM SERPL-MCNC: 7.7 MG/DL — LOW (ref 8.4–10.5)
CALCIUM SERPL-MCNC: 8.3 MG/DL — LOW (ref 8.4–10.5)
CHLORIDE SERPL-SCNC: 95 MMOL/L — LOW (ref 96–108)
CHLORIDE SERPL-SCNC: 98 MMOL/L — SIGNIFICANT CHANGE UP (ref 96–108)
CO2 SERPL-SCNC: 23 MMOL/L — SIGNIFICANT CHANGE UP (ref 22–31)
CO2 SERPL-SCNC: 24 MMOL/L — SIGNIFICANT CHANGE UP (ref 22–31)
CREAT SERPL-MCNC: 0.6 MG/DL — SIGNIFICANT CHANGE UP (ref 0.5–1.3)
CREAT SERPL-MCNC: 0.72 MG/DL — SIGNIFICANT CHANGE UP (ref 0.5–1.3)
EGFR: 120 ML/MIN/1.73M2 — SIGNIFICANT CHANGE UP
EGFR: 127 ML/MIN/1.73M2 — SIGNIFICANT CHANGE UP
GLUCOSE SERPL-MCNC: 121 MG/DL — HIGH (ref 70–99)
GLUCOSE SERPL-MCNC: 129 MG/DL — HIGH (ref 70–99)
HCT VFR BLD CALC: 29.4 % — LOW (ref 39–50)
HGB BLD-MCNC: 10.2 G/DL — LOW (ref 13–17)
MAGNESIUM SERPL-MCNC: 2.2 MG/DL — SIGNIFICANT CHANGE UP (ref 1.6–2.6)
MAGNESIUM SERPL-MCNC: 2.4 MG/DL — SIGNIFICANT CHANGE UP (ref 1.6–2.6)
MCHC RBC-ENTMCNC: 28.1 PG — SIGNIFICANT CHANGE UP (ref 27–34)
MCHC RBC-ENTMCNC: 34.7 G/DL — SIGNIFICANT CHANGE UP (ref 32–36)
MCV RBC AUTO: 81 FL — SIGNIFICANT CHANGE UP (ref 80–100)
NRBC # BLD: 0 /100 WBCS — SIGNIFICANT CHANGE UP (ref 0–0)
PHOSPHATE SERPL-MCNC: 1.9 MG/DL — LOW (ref 2.5–4.5)
PHOSPHATE SERPL-MCNC: 2.5 MG/DL — SIGNIFICANT CHANGE UP (ref 2.5–4.5)
PLATELET # BLD AUTO: 163 K/UL — SIGNIFICANT CHANGE UP (ref 150–400)
POTASSIUM SERPL-MCNC: 3.5 MMOL/L — SIGNIFICANT CHANGE UP (ref 3.5–5.3)
POTASSIUM SERPL-MCNC: 3.9 MMOL/L — SIGNIFICANT CHANGE UP (ref 3.5–5.3)
POTASSIUM SERPL-SCNC: 3.5 MMOL/L — SIGNIFICANT CHANGE UP (ref 3.5–5.3)
POTASSIUM SERPL-SCNC: 3.9 MMOL/L — SIGNIFICANT CHANGE UP (ref 3.5–5.3)
PROT SERPL-MCNC: 5.9 G/DL — LOW (ref 6–8.3)
RBC # BLD: 3.63 M/UL — LOW (ref 4.2–5.8)
RBC # FLD: 17.2 % — HIGH (ref 10.3–14.5)
SODIUM SERPL-SCNC: 131 MMOL/L — LOW (ref 135–145)
SODIUM SERPL-SCNC: 131 MMOL/L — LOW (ref 135–145)
WBC # BLD: 14.44 K/UL — HIGH (ref 3.8–10.5)
WBC # FLD AUTO: 14.44 K/UL — HIGH (ref 3.8–10.5)

## 2024-11-01 PROCEDURE — 99222 1ST HOSP IP/OBS MODERATE 55: CPT

## 2024-11-01 RX ORDER — LIDOCAINE HYDROCHLORIDE 40 MG/ML
1 SOLUTION TOPICAL DAILY
Refills: 0 | Status: DISCONTINUED | OUTPATIENT
Start: 2024-11-01 | End: 2024-11-05

## 2024-11-01 RX ORDER — IBUPROFEN 200 MG
600 TABLET ORAL ONCE
Refills: 0 | Status: COMPLETED | OUTPATIENT
Start: 2024-11-01 | End: 2024-11-01

## 2024-11-01 RX ORDER — CALCIUM GLUCONATE 98 MG/ML
2 INJECTION, SOLUTION INTRAVENOUS ONCE
Refills: 0 | Status: COMPLETED | OUTPATIENT
Start: 2024-11-01 | End: 2024-11-01

## 2024-11-01 RX ORDER — SODIUM PHOSPHATE, MONOBASIC, MONOHYDRATE AND SODIUM PHOSPHATE, DIBASIC ANHYDROUS 276; 142 MG/ML; MG/ML
30 INJECTION, SOLUTION INTRAVENOUS ONCE
Refills: 0 | Status: COMPLETED | OUTPATIENT
Start: 2024-11-01 | End: 2024-11-01

## 2024-11-01 RX ORDER — POTASSIUM CHLORIDE 10 MEQ
40 TABLET, EXTENDED RELEASE ORAL ONCE
Refills: 0 | Status: COMPLETED | OUTPATIENT
Start: 2024-11-01 | End: 2024-11-01

## 2024-11-01 RX ORDER — ENOXAPARIN SODIUM 40MG/0.4ML
40 SYRINGE (ML) SUBCUTANEOUS EVERY 24 HOURS
Refills: 0 | Status: DISCONTINUED | OUTPATIENT
Start: 2024-11-01 | End: 2024-11-05

## 2024-11-01 RX ADMIN — LIDOCAINE HYDROCHLORIDE 1 PATCH: 40 SOLUTION TOPICAL at 21:31

## 2024-11-01 RX ADMIN — Medication 650 MILLIGRAM(S): at 15:22

## 2024-11-01 RX ADMIN — Medication 650 MILLIGRAM(S): at 21:01

## 2024-11-01 RX ADMIN — OXYCODONE HYDROCHLORIDE 10 MILLIGRAM(S): 30 TABLET ORAL at 09:54

## 2024-11-01 RX ADMIN — Medication 650 MILLIGRAM(S): at 15:52

## 2024-11-01 RX ADMIN — OXYCODONE HYDROCHLORIDE 10 MILLIGRAM(S): 30 TABLET ORAL at 02:58

## 2024-11-01 RX ADMIN — Medication 650 MILLIGRAM(S): at 01:30

## 2024-11-01 RX ADMIN — OXYCODONE HYDROCHLORIDE 10 MILLIGRAM(S): 30 TABLET ORAL at 02:28

## 2024-11-01 RX ADMIN — Medication 600 MILLIGRAM(S): at 05:17

## 2024-11-01 RX ADMIN — LIDOCAINE HYDROCHLORIDE 1 PATCH: 40 SOLUTION TOPICAL at 09:23

## 2024-11-01 RX ADMIN — Medication 650 MILLIGRAM(S): at 09:24

## 2024-11-01 RX ADMIN — Medication 600 MILLIGRAM(S): at 05:47

## 2024-11-01 RX ADMIN — Medication 2 GRAM(S): at 05:17

## 2024-11-01 RX ADMIN — SODIUM PHOSPHATE, MONOBASIC, MONOHYDRATE AND SODIUM PHOSPHATE, DIBASIC ANHYDROUS 85 MILLIMOLE(S): 276; 142 INJECTION, SOLUTION INTRAVENOUS at 02:41

## 2024-11-01 RX ADMIN — Medication 40 MILLIEQUIVALENT(S): at 15:22

## 2024-11-01 RX ADMIN — Medication 650 MILLIGRAM(S): at 01:00

## 2024-11-01 RX ADMIN — OXYCODONE HYDROCHLORIDE 10 MILLIGRAM(S): 30 TABLET ORAL at 17:22

## 2024-11-01 RX ADMIN — Medication 650 MILLIGRAM(S): at 12:34

## 2024-11-01 RX ADMIN — Medication 2 GRAM(S): at 17:22

## 2024-11-01 RX ADMIN — FENOFIBRATE 145 MILLIGRAM(S): 145 TABLET, FILM COATED ORAL at 15:23

## 2024-11-01 RX ADMIN — OXYCODONE HYDROCHLORIDE 10 MILLIGRAM(S): 30 TABLET ORAL at 09:24

## 2024-11-01 RX ADMIN — Medication 64.8 MILLIGRAM(S): at 18:29

## 2024-11-01 RX ADMIN — CALCIUM GLUCONATE 200 GRAM(S): 98 INJECTION, SOLUTION INTRAVENOUS at 02:41

## 2024-11-01 RX ADMIN — OXYCODONE HYDROCHLORIDE 5 MILLIGRAM(S): 30 TABLET ORAL at 23:29

## 2024-11-01 RX ADMIN — OXYCODONE HYDROCHLORIDE 5 MILLIGRAM(S): 30 TABLET ORAL at 22:29

## 2024-11-01 RX ADMIN — Medication 650 MILLIGRAM(S): at 22:20

## 2024-11-01 NOTE — DIETITIAN INITIAL EVALUATION ADULT - ORAL INTAKE PTA/DIET HISTORY
Pt reports good PO intake and appetite PTA up until onset of symptoms. Typically consumes 2 meals per day due to work schedule. NKFA. Pt denies chewing/swallowing difficulty, nausea, vomiting, diarrhea, constipation at baseline. Consumes mostly home cooked meals. Per chart pt noted with moderate ETOH intake (5 drinks every other day per chart).

## 2024-11-01 NOTE — DIETITIAN INITIAL EVALUATION ADULT - EDUCATION DIETARY MODIFICATIONS
General healthy diet, low fat diet recommendations, importance of adequate hydration/teach back/(2) meets goals/outcomes/verbalization

## 2024-11-01 NOTE — DIETITIAN INITIAL EVALUATION ADULT - ADD RECOMMEND
1) As medically feasible recommend resuming low fat diet. 2) Diet education provided, reinforce as needed.

## 2024-11-01 NOTE — DIETITIAN INITIAL EVALUATION ADULT - PERTINENT LABORATORY DATA
11-01    131[L]  |  95[L]  |  8   ----------------------------<  129[H]  3.5   |  23  |  0.60    Ca    8.3[L]      01 Nov 2024 07:04  Phos  2.5     11-01  Mg     2.2     11-01    TPro  5.9[L]  /  Alb  2.5[L]  /  TBili  4.1[H]  /  DBili  3.3[H]  /  AST  232[H]  /  ALT  112[H]  /  AlkPhos  142[H]  11-01  A1C with Estimated Average Glucose Result: 5.5 % (10-29-24 @ 07:03)

## 2024-11-01 NOTE — DIETITIAN INITIAL EVALUATION ADULT - REASON FOR ADMISSION
Acute pancreatitis without infection or necrosis    Chart reviewed, events noted. This is a "38 y M with no significant pmh, presenting with acute onset abdominal pain that woke him up this morning. CT showed gallbladder sludge, normal CBD, acute interstitial edematous pancreatitis.  Admitted to surgery service for gallstone pancreatitis. Pending MRCP to determine future plans.

## 2024-11-01 NOTE — CONSULT NOTE ADULT - SUBJECTIVE AND OBJECTIVE BOX
[facility] MEDICINE ATTENDING INITIAL CONSULT NOTE    Becky Herbert MD  Division of Hospital Medicine  Available via MS teams  If no answer, please page [phone]    HPI: 38 y M with no significant pmh, presenting with acute onset abdominal pain that woke him up this morning, No associated nausea, vomiting, fevers or chills.     In the ED, patient was afebrile, VSS, WBC 10. Na 129, T bili 2.4, Alk phos 172, , , lipase 1122, lactate 5.2. CT showed gallbladder sludge, normal CBD, acute interstitial edematous pancreatitis.        SUBJECTIVE:   seen at bedside  pt reports feeling better overall  pain med working well  some nausea but no vomiting  tolerating clears  no f/chills  only mild abd pain  feels bloated    PAST MEDICAL & SURGICAL HISTORY:      Review of Systems:   CONSTITUTIONAL: No fever, weight loss, or fatigue  EYES: No eye pain, visual disturbances, or discharge  ENMT:  No difficulty hearing, tinnitus, vertigo; No sinus or throat pain  NECK: No pain or stiffness  RESPIRATORY: No cough, wheezing, chills or hemoptysis; No shortness of breath  CARDIOVASCULAR: No chest pain, palpitations, dizziness, or leg swelling  GASTROINTESTINAL: +abd pain, nausea  GENITOURINARY: No dysuria, frequency, hematuria, or incontinence  NEUROLOGICAL: No headaches, memory loss, loss of strength, numbness, or tremors  SKIN: No itching, burning, rashes, or lesions   ENDOCRINE: No heat or cold intolerance; No hair loss  MUSCULOSKELETAL: No joint pain or swelling; No muscle, back, or extremity pain  PSYCHIATRIC: No depression, anxiety, mood swings, or difficulty sleeping  HEME/LYMPH: No easy bruising, or bleeding gums  ALLERY AND IMMUNOLOGIC: No hives or eczema    Allergies    No Known Allergies    Intolerances    Social History:   drinks every other day 5 drinks (2 beers, and rest spiked cider)    FAMILY HISTORY:  HLD    CAPILLARY BLOOD GLUCOSE        Vital Signs Last 24 Hrs  T(C): 36.6 (28 Oct 2024 12:06), Max: 38.1 (27 Oct 2024 21:42)  T(F): 97.9 (28 Oct 2024 12:06), Max: 100.5 (27 Oct 2024 21:42)  HR: 122 (28 Oct 2024 12:06) (108 - 145)  BP: 130/94 (28 Oct 2024 12:06) (112/92 - 152/110)  BP(mean): --  RR: 18 (28 Oct 2024 12:06) (17 - 22)  SpO2: 94% (28 Oct 2024 12:06) (94% - 100%)    Parameters below as of 28 Oct 2024 12:06  Patient On (Oxygen Delivery Method): room air        PHYSICAL EXAM:  GENERAL:  Well appearing, in NAD  EYES: conjunctiva clear  NECK: Supple, No JVD  CHEST/LUNG: CTA B/L. No w/r/r.  HEART: sinus tach. Normal S1, S2. No m/r/g.   ABDOMEN: SNTND  EXTREMITIES:  2+ Peripheral Pulses, No clubbing, cyanosis, edema.  PSYCH: AAOx3, appropriate affect  SKIN: No rashes or lesions    LABS:                        15.8   9.81  )-----------( 144      ( 28 Oct 2024 06:38 )             47.1     10-28    130[L]  |  98  |  15  ----------------------------<  140[H]  4.5   |  13[L]  |  1.42[H]    Ca    7.0[L]      28 Oct 2024 09:40  Phos  3.7     10-28  Mg     1.0     10-28    TPro  5.8[L]  /  Alb  3.0[L]  /  TBili  2.7[H]  /  DBili  x   /  AST  456[H]  /  ALT  183[H]  /  AlkPhos  120  10-28    PT/INR - ( 28 Oct 2024 09:40 )   PT: 11.7 sec;   INR: 1.03 ratio         PTT - ( 28 Oct 2024 09:40 )  PTT:30.2 sec      Urinalysis Basic - ( 28 Oct 2024 09:40 )    Color: x / Appearance: x / SG: x / pH: x  Gluc: 140 mg/dL / Ketone: x  / Bili: x / Urobili: x   Blood: x / Protein: x / Nitrite: x   Leuk Esterase: x / RBC: x / WBC x   Sq Epi: x / Non Sq Epi: x / Bacteria: x          MEDICATIONS  (STANDING):  acetaminophen   IVPB .. 1000 milliGRAM(s) IV Intermittent every 6 hours  enoxaparin Injectable 40 milliGRAM(s) SubCutaneous every 24 hours  influenza   Vaccine 0.5 milliLiter(s) IntraMuscular once  lactated ringers. 1000 milliLiter(s) (120 mL/Hr) IV Continuous <Continuous>    MEDICATIONS  (PRN):  HYDROmorphone  Injectable 0.5 milliGRAM(s) IV Push every 6 hours PRN Severe Pain (7 - 10)  HYDROmorphone  Injectable 0.25 milliGRAM(s) IV Push every 6 hours PRN Moderate Pain (4 - 6)      Home Medications:    
  HPI:    HPI: 38 y M with no significant pmh, presenting with acute onset abdominal pain that woke him up this morning, No associated nausea, vomiting, fevers or chills.   In the ED, patient was afebrile, VSS, WBC 10. Na 129, T bili 2.4, Alk phos 172, , , lipase 1122, lactate 5.2. CT showed gallbladder sludge, normal CBD, acute interstitial edematous pancreatitis.    ALLERGIES: NKDA  REVIEW OF SYSTEMS: All ROS negative except as per HPI.    ENDOCRINE HX:  Patient is a 38 year old male with no significant past medical history who presented to the ED with abdominal pain that started Sunday morning. Acute onset.   Lipase on presentation 1122, CT with acute pancreatitis. Patient admitted for gallstone pancreatitis.  Further workup demonstrated TG ov 1649.   Corrected Ca today 7.6. Mag 1.5. Tachycardic. Reports that his pain has improved significantly.   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   Patient reports that he has had high "cholesterol" problems in the past, says his number was 250. Father of the patient at bedside states that several of his family members had elevated triglycerides but that he himself has normal TGs. Monitors regularly. Patient's paternal grandfather with history of CVA at 62. No early CAD in the family.   Patient denies prior history of pancreatitis.   Diet is heavy on vegetables, he also has roti, chicken, cheese. Does not incorporate significantly high amounts of fatty foods, red meat or fried foods in his diet.   Patient reports alcohol use of about 2 beers every other day but per wife at bedside his alcohol intake is greater than that. No tobacco use.     PAST MEDICAL & SURGICAL HISTORY:  Denies past medical or surgical history    FAMILY HISTORY:  Paternal family with hypertriglyceridemia. Paternal grandfather with CVA at age 62. Also has family history of ovarian and breast cancer.     Social History: Reports alcohol use of 2 beers every other day but per wife at bedside his alcohol intake is greater than that. Denies tobacco use.     Outpatient Medications: Denies    MEDICATIONS  (STANDING):  acetaminophen     Tablet .. 650 milliGRAM(s) Oral every 6 hours  enoxaparin Injectable 40 milliGRAM(s) SubCutaneous every 24 hours  influenza   Vaccine 0.5 milliLiter(s) IntraMuscular once  lactated ringers. 1000 milliLiter(s) (150 mL/Hr) IV Continuous <Continuous>  PHENobarbital   Oral   PHENobarbital 64.8 milliGRAM(s) Oral every 8 hours    MEDICATIONS  (PRN):  oxyCODONE    IR 5 milliGRAM(s) Oral every 6 hours PRN Moderate Pain (4 - 6)  oxyCODONE    IR 10 milliGRAM(s) Oral every 6 hours PRN Severe Pain (7 - 10)      Allergies    No Known Allergies    Intolerances      Review of Systems:  Constitutional: No fever, denies unintended weight gain.   Eyes: No blurry vision  Neuro: No tremors  HEENT: No pain  Cardiovascular: No chest pain, palpitations  Respiratory: No SOB, no cough  GI: Has abdominal pain  : No dysuria  Skin: no rash  Psych: no depression  Endocrine: no polyuria, polydipsia  Hem/lymph: no swelling  Osteoporosis: no fractures    ALL OTHER SYSTEMS REVIEWED AND NEGATIVE    PHYSICAL EXAM:  VITALS: T(C): 37 (10-29-24 @ 09:44)  T(F): 98.6 (10-29-24 @ 09:44), Max: 98.6 (10-29-24 @ 09:44)  HR: 107 (10-29-24 @ 09:44) (105 - 122)  BP: 121/83 (10-29-24 @ 09:44) (104/65 - 121/91)  RR:  (18 - 18)  SpO2:  (93% - 95%)  Wt(kg): 77.1 kg  GENERAL: NAD, well-groomed, well-developed  EYES: No proptosis, no lid lag, anicteric  HEENT:  Atraumatic, Normocephalic, moist mucous membranes  RESPIRATORY: Normal respiratory effort; no audible wheezing  SKIN: Dry, intact, No rashes or lesions  MUSCULOSKELETAL: Full range of motion, normal strength  NEURO: sensation intact, extraocular movements intact, no tremor  PSYCH: Alert and oriented x 3, normal affect, normal mood  CUSHING'S SIGNS: he has striae on his abdomen but no buffalo hump or proximal muscle weakness.       CAPILLARY BLOOD GLUCOSE                                13.3   10.86 )-----------( 96       ( 29 Oct 2024 07:03 )             39.6       10-29    130[L]  |  98  |  25[H]  ----------------------------<  175[H]  4.5   |  16[L]  |  2.46[H]    eGFR: 34[L]    Ca    6.2[LL]      10-29  Mg     1.5     10-29  Phos  3.3     10-29    TPro  5.4[L]  /  Alb  2.3[L]  /  TBili  3.2[H]  /  DBili  2.4[H]  /  AST  215[H]  /  ALT  115[H]  /  AlkPhos  84  10-29      Thyroid Function Tests:          10-27 Chol -- Direct LDL -- LDL calculated -- HDL -- Trig 1649[H]    Radiology:             
  Chief Complaint:  Patient is a 38y old  Male who presents with a chief complaint of Acute pancreatitis without infection or necrosis    Chart reviewed, events noted. This is a "38 y M with no significant pmh, presenting with acute onset abdominal pain that woke him up this morning. CT showed gallbladder sludge, normal CBD, acute interstitial edematous pancreatitis.  Admitted to surgery service for gallstone pancreatitis. Pending MRCP to determine future plans.    (01 Nov 2024 09:45)      HPI:  Pt is 38 y M with no significant pmh, presenting with acute onset abdominal pain that woke him up on the AM 10/27, No associated nausea, vomiting, fevers or chills. Patient reports having 5+ drinks over the weekend.    ED: patient was afebrile, VSS, WBC 10. Na 129, T bili 2.4, Alk phos 172, , , lipase 1122, lactate 5.2. CT showed gallbladder sludge, normal CBD, acute interstitial edematous   pancreatitis.  —> admitted to surgery for acute gallstone pancreatitis    Pt was started on phenobarbital taper on admission for possible EtOH withdrawal. Per pt he has never been hospitalized or treated for withdrawal in the past.    Hepatology c/s for: severe steatohepatitis       Allergies:  No Known Allergies      Home Medications:    Hospital Medications:  acetaminophen     Tablet .. 650 milliGRAM(s) Oral every 6 hours  enoxaparin Injectable 40 milliGRAM(s) SubCutaneous every 24 hours  fenofibrate Tablet 145 milliGRAM(s) Oral daily  influenza   Vaccine 0.5 milliLiter(s) IntraMuscular once  lidocaine   4% Patch 1 Patch Transdermal daily PRN  omega-3-Acid Ethyl Esters 2 Gram(s) Oral two times a day  oxyCODONE    IR 5 milliGRAM(s) Oral every 6 hours PRN  oxyCODONE    IR 10 milliGRAM(s) Oral every 6 hours PRN      PMHX/PSHX:      Family history:      Social History:     ROS:     General:  No wt loss, fevers, chills, night sweats, fatigue,   CV:  No pain, palpitations  Resp:  No dyspnea, cough, tachypnea, wheezing  GI:  +diffuse pain, No nausea, No vomiting, No diarrhea, No constipation, No weight loss, No fever, No pruritis, No rectal bleeding, No tarry stools, No dysphagia,  :  +scrotal swelling  Neuro:  No memory problems      PHYSICAL EXAM:   Vital Signs:  Vital Signs Last 24 Hrs  T(C): 37.7 (01 Nov 2024 17:24), Max: 37.7 (01 Nov 2024 17:24)  T(F): 99.8 (01 Nov 2024 17:24), Max: 99.8 (01 Nov 2024 17:24)  HR: 104 (01 Nov 2024 17:24) (85 - 104)  BP: 141/98 (01 Nov 2024 17:24) (132/92 - 144/94)  BP(mean): --  RR: 18 (01 Nov 2024 17:24) (18 - 18)  SpO2: 95% (01 Nov 2024 17:24) (94% - 96%)    Parameters below as of 01 Nov 2024 17:24  Patient On (Oxygen Delivery Method): room air      Daily     Daily     GENERAL:  Appears stated age, well-groomed, well-nourished, no distress  HEENT:  anicteric sclera  CHEST:  CTA b/l  HEART:  RRR, S1, S2  ABDOMEN:  Tense, distended, NTTP,  normoactive bowel sounds, no masses ,no hepato-splenomegaly, no signs of chronic liver disease  EXTEREMITIES:  2+ edema b/l lower extremities  NEURO:  AOx3, no asterixis, no tremor, no encephalopathy    LABS:                        10.2   14.44 )-----------( 163      ( 01 Nov 2024 07:04 )             29.4     11-01    131[L]  |  95[L]  |  8   ----------------------------<  129[H]  3.5   |  23  |  0.60    Ca    8.3[L]      01 Nov 2024 07:04  Phos  2.5     11-01  Mg     2.2     11-01    TPro  5.9[L]  /  Alb  2.5[L]  /  TBili  4.1[H]  /  DBili  3.3[H]  /  AST  232[H]  /  ALT  112[H]  /  AlkPhos  142[H]  11-01    LIVER FUNCTIONS - ( 01 Nov 2024 07:04 )  Alb: 2.5 g/dL / Pro: 5.9 g/dL / ALK PHOS: 142 U/L / ALT: 112 U/L / AST: 232 U/L / GGT: x             Urinalysis Basic - ( 01 Nov 2024 07:04 )    Color: x / Appearance: x / SG: x / pH: x  Gluc: 129 mg/dL / Ketone: x  / Bili: x / Urobili: x   Blood: x / Protein: x / Nitrite: x   Leuk Esterase: x / RBC: x / WBC x   Sq Epi: x / Non Sq Epi: x / Bacteria: x          Imaging:   EXAM:  MR MRCP [facility] IC   ORDERED BY:  RYLAN SANTOS     PROCEDURE DATE:  10/31/2024          INTERPRETATION:  CLINICAL INFORMATION: Gallstone pancreatitis.    COMPARISON: 10/20/2024 ultrasound and 10/27/2024 CT.    CONTRAST/COMPLICATIONS:  IV Contrast: Gadavist  8 cc administered   2 cc discarded  Oral Contrast: NONE  Complications: None reported at time of study completion    PROCEDURE:  MRI of the abdomen was performed.  MRCP was performed.    FINDINGS:  LOWER CHEST: Bilateral pleural effusion/airspace opacity, left greater   than right.    LIVER: Hepatomegaly with severe steatosis.  BILE DUCTS: Normal caliber. No intraductal calculus.  GALLBLADDER: Distended gallbladder with trace pericholecystic fluid   without wall thickening of calculus.  SPLEEN: Within normal limits.  PANCREAS: Enlarged and edematous pancreas especially in the tail with   peripheral, pancreatic fluid. No pancreatic ductal dilatation. 2.0 x 1.7   cm area of the ventral pancreatic tail not enhancement noted, concerning   for underlying necrosis. No discrete rim-enhancing collection noted.  ADRENALS: Within normal limits.  KIDNEYS/URETERS: Within normal limits.    VISUALIZED PORTIONS:  BOWEL: Within normal limits.  PERITONEUM: Trace free fluid.  VESSELS: Portal, hepatic, SMV, and splenic veins are patent.  RETROPERITONEUM/LYMPH NODES: Fluid noted in the left retroperitoneum  ABDOMINAL WALL: Anasarca.  BONES: Within normal limits.    IMPRESSION:  Pancreatitis with small area of necrosis in the tail. No pseudocyst or   abscess identified.  Pleural effusion, mild ascites, left retroperitoneum fluid, and anasarca   noted.

## 2024-11-01 NOTE — DIETITIAN INITIAL EVALUATION ADULT - ENERGY INTAKE
Adequate (%) In-house pt with good tolerance to diet so far. Has been eating lighter meals purposefully but denies any nausea, emesis. Currently NPO for MRCP.

## 2024-11-01 NOTE — CONSULT NOTE ADULT - ASSESSMENT
# Alcohol associated hepatitis  # hepatic steatosis    # acute pancreatitis   # anasarca   - Anupama discriminate function score can only be calculated from 10/28 however is 7.3, indicating good prognosis and no need for glucocorticoid therapy  - acute pancreatitis likely 2/2 alcohol misuse, pt is receiving aggressive fluid resuscitation   - anasarca may be 2/2 pancreatitis however may also be 2/2 liver dysfunction    RECOMMENDATION  - please obtain updated coag panel   - trend CBC, CMP, INR   - diuretics: would hold off diuretics til pancreatitis resolves  - supportive care as per primary team # Alcohol associated hepatitis  # hepatic steatosis    # acute pancreatitis   # anasarca   - Anupama discriminate function score can only be calculated from 10/28 however is 7.3, indicating good prognosis and no need for glucocorticoid therapy  - acute pancreatitis likely 2/2 alcohol misuse, pt is receiving aggressive fluid resuscitation   - anasarca may be 2/2 pancreatitis however may also be 2/2 liver dysfunction    RECOMMENDATION  - please obtain updated coag panel   - trend CBC, CMP, INR   - fluid management as per primary team  - supportive care as per primary team

## 2024-11-01 NOTE — PROGRESS NOTE ADULT - ASSESSMENT
38 y M with no significant pmh, presenting with acute onset abdominal pain that woke him up this morning, No associated nausea, vomiting, fevers or chills. Patient reports having 5 drink on the weekend, In the ED, patient was afebrile, VSS, WBC 10. Na 129, T bili 2.4, Alk phos 172, , , lipase 1122, lactate 5.2. CT showed gallbladder sludge, normal CBD, acute interstitial edematous pancreatitis.  Admitted to surgery service for gallstone pancreatitis. Pending MRCP to determine future plans.     Plan:  - Diet: NPO MRCP red pending, GI consulted for elevated bilirubin   - Phenobarb taper   - Pain control PRN   - IVF while NPO  - Monitor UOP  - Replete electrolytes PRN   - supportive underwear for scrotal edema       General Surgery  Trauma/ACS [id number]

## 2024-11-01 NOTE — PROGRESS NOTE ADULT - SUBJECTIVE AND OBJECTIVE BOX
ACS SURGERY DAILY PROGRESS NOTE:     SUBJECTIVE/ROS: Patient seen and examined. abdominal pain is improving.     MEDICATIONS  (STANDING):  acetaminophen     Tablet .. 650 milliGRAM(s) Oral every 6 hours  fenofibrate Tablet 145 milliGRAM(s) Oral daily  influenza   Vaccine 0.5 milliLiter(s) IntraMuscular once  omega-3-Acid Ethyl Esters 2 Gram(s) Oral two times a day  PHENobarbital   Oral   PHENobarbital 64.8 milliGRAM(s) Oral every 24 hours    MEDICATIONS  (PRN):  lidocaine   4% Patch 1 Patch Transdermal daily PRN pain  oxyCODONE    IR 10 milliGRAM(s) Oral every 6 hours PRN Severe Pain (7 - 10)  oxyCODONE    IR 5 milliGRAM(s) Oral every 6 hours PRN Moderate Pain (4 - 6)      OBJECTIVE:    Vital Signs Last 24 Hrs  T(C): 37.4 (01 Nov 2024 05:19), Max: 37.4 (01 Nov 2024 05:19)  T(F): 99.3 (01 Nov 2024 05:19), Max: 99.3 (01 Nov 2024 05:19)  HR: 100 (01 Nov 2024 05:19) (85 - 102)  BP: 139/100 (01 Nov 2024 05:19) (116/82 - 146/93)  BP(mean): --  RR: 18 (01 Nov 2024 05:19) (18 - 18)  SpO2: 96% (01 Nov 2024 05:19) (95% - 96%)    Parameters below as of 01 Nov 2024 05:19  Patient On (Oxygen Delivery Method): room air            I&O's Detail    31 Oct 2024 07:01  -  01 Nov 2024 07:00  --------------------------------------------------------  IN:    Oral Fluid: 940 mL  Total IN: 940 mL    OUT:    Voided (mL): 1400 mL  Total OUT: 1400 mL    Total NET: -460 mL          Daily     Daily     LABS:                        10.2   14.44 )-----------( 163      ( 01 Nov 2024 07:04 )             29.4     11-01    131[L]  |  95[L]  |  8   ----------------------------<  129[H]  3.5   |  23  |  0.60    Ca    8.3[L]      01 Nov 2024 07:04  Phos  2.5     11-01  Mg     2.2     11-01    TPro  5.9[L]  /  Alb  2.5[L]  /  TBili  4.1[H]  /  DBili  3.3[H]  /  AST  232[H]  /  ALT  112[H]  /  AlkPhos  142[H]  11-01      Urinalysis Basic - ( 01 Nov 2024 07:04 )    Color: x / Appearance: x / SG: x / pH: x  Gluc: 129 mg/dL / Ketone: x  / Bili: x / Urobili: x   Blood: x / Protein: x / Nitrite: x   Leuk Esterase: x / RBC: x / WBC x   Sq Epi: x / Non Sq Epi: x / Bacteria: x                PHYSICAL EXAM:  GEN: resting in bed comfortably in NAD  RESP: no increased WOB, no tachypnea, on RA  ABD: soft, non-distended abdomen. Mild tenderness to palpation in epigastrium. No rebound, guarding or rigidity.   Groin: Scrotum edematous, no skin changes or warmth.   EXTR: warm, well-perfused without gross deformities; spontaneous movement in b/l U/L extrem  NEURO: awake, alert

## 2024-11-01 NOTE — PROGRESS NOTE ADULT - SUBJECTIVE AND OBJECTIVE BOX
SUBJECTIVE / OVERNIGHT EVENTS:pt seen and examined  11-1-24    MEDICATIONS  (STANDING):  acetaminophen     Tablet .. 650 milliGRAM(s) Oral every 6 hours  fenofibrate Tablet 145 milliGRAM(s) Oral daily  influenza   Vaccine 0.5 milliLiter(s) IntraMuscular once  omega-3-Acid Ethyl Esters 2 Gram(s) Oral two times a day  PHENobarbital   Oral   PHENobarbital 64.8 milliGRAM(s) Oral every 24 hours  potassium chloride    Tablet ER 40 milliEquivalent(s) Oral once    MEDICATIONS  (PRN):  lidocaine   4% Patch 1 Patch Transdermal daily PRN pain  oxyCODONE    IR 10 milliGRAM(s) Oral every 6 hours PRN Severe Pain (7 - 10)  oxyCODONE    IR 5 milliGRAM(s) Oral every 6 hours PRN Moderate Pain (4 - 6)    Vital Signs Last 24 Hrs  T(C): 36.8 (11-01-24 @ 13:16), Max: 37.4 (11-01-24 @ 05:19)  T(F): 98.2 (11-01-24 @ 13:16), Max: 99.3 (11-01-24 @ 05:19)  HR: 94 (11-01-24 @ 13:16) (85 - 102)  BP: 132/92 (11-01-24 @ 13:16) (130/81 - 144/94)  BP(mean): --  RR: 18 (11-01-24 @ 13:16) (18 - 18)  SpO2: 94% (11-01-24 @ 13:16) (94% - 96%)      Constitutional: No fever, fatigue  Skin: No rash.  Eyes: No recent vision problems or eye pain.  ENT: No congestion, ear pain, or sore throat.  Cardiovascular: No chest pain or palpation.  Respiratory: No cough, shortness of breath, congestion, or wheezing.  Gastrointestinal: dec  abdominal pain, nausea, vomiting, or diarrhea.  Genitourinary: No dysuria.  Musculoskeletal: No joint swelling.  Neurologic: No headache.    PHYSICAL EXAM:  GENERAL: NAD  EYES: EOMI, PERRLA  NECK: Supple, No JVD  CHEST/LUNG: dec breath sounds at bases  HEART:  S1 , S2 +  ABDOMEN: soft , mild distension+, tenderness on deep palpation+, no guarding, bs+  EXTREMITIES:  no edema  NEUROLOGY:alert awake oriented     \LABS:  11-01    131[L]  |  95[L]  |  8   ----------------------------<  129[H]  3.5   |  23  |  0.60    Ca    8.3[L]      01 Nov 2024 07:04  Phos  2.5     11-01  Mg     2.2     11-01    TPro  5.9[L]  /  Alb  2.5[L]  /  TBili  4.1[H]  /  DBili  3.3[H]  /  AST  232[H]  /  ALT  112[H]  /  AlkPhos  142[H]  11-01    Creatinine Trend: 0.60 <--, 0.72 <--, 0.70 <--, 0.73 <--, 0.77 <--, 0.81 <--, 0.79 <--, 1.04 <--, 1.37 <--, 1.74 <--, 2.46 <--, 2.43 <--, 1.42 <--, 1.31 <--, 1.17 <--                        10.2   14.44 )-----------( 163      ( 01 Nov 2024 07:04 )             29.4     Urine Studies:  Urinalysis Basic - ( 01 Nov 2024 07:04 )    Color:  / Appearance:  / SG:  / pH:   Gluc: 129 mg/dL / Ketone:   / Bili:  / Urobili:    Blood:  / Protein:  / Nitrite:    Leuk Esterase:  / RBC:  / WBC    Sq Epi:  / Non Sq Epi:  / Bacteria:       Osmolality, Random Urine: 343 mos/kg (10-31 @ 06:38)  Sodium, Random Urine: 5 mmol/L (10-31 @ 06:38)  Creatinine, Random Urine: 75 mg/dL (10-31 @ 06:38)  Sodium, Random Urine: 10 mmol/L (10-29 @ 03:39)  Osmolality, Random Urine: 385 mos/kg (10-29 @ 03:39)  Creatinine, Random Urine: 218 mg/dL (10-29 @ 03:39)          LIVER FUNCTIONS - ( 01 Nov 2024 07:04 )  Alb: 2.5 g/dL / Pro: 5.9 g/dL / ALK PHOS: 142 U/L / ALT: 112 U/L / AST: 232 U/L / GGT: x                 Osmolality, Random Urine: 343 mos/kg (10-31 @ 06:38)  Sodium, Random Urine: 5 mmol/L (10-31 @ 06:38)  Creatinine, Random Urine: 75 mg/dL (10-31 @ 06:38)  Sodium, Random Urine: 10 mmol/L (10-29 @ 03:39)  Osmolality, Random Urine: 385 mos/kg (10-29 @ 03:39)  Creatinine, Random Urine: 218 mg/dL (10-29 @ 03:39)          LIVER FUNCTIONS - ( 31 Oct 2024 06:35 )  Alb: 2.5 g/dL / Pro: 5.5 g/dL / ALK PHOS: 98 U/L / ALT: 77 U/L / AST: 134 U/L / GGT: x                   RADIOLOGY & ADDITIONAL TESTS:    Imaging Personally Reviewed:    Consultant(s) Notes Reviewed:      Care Discussed with Consultants/Other Providers:

## 2024-11-01 NOTE — CONSULT NOTE ADULT - ATTENDING COMMENTS
37 yo M with overweight BMI, dyslipidemia (not on medication), and AUD, admitted with acute necrotizing pancreatitis (as on CT on 10/27) likely related to alcohol versus gallstones along with alcohol-associated hepatitis (AH) with recent onset of jaundice and hepatomegaly. No evidence of biliary obstruction on MRI/MRCP (10/31). No alcohol withdrawal symptoms and recommend discontinuing phenobarbital. He can be monitored on CIWA protocol (with symptom-triggered lorazepam) if needed, though likely outside the window when he would be expected to develop new withdrawal symptoms. His AH is non-severe with mDF <32 based on last available labs, but given rising hyperbilirubinemia, his liver tests (including INR) need to be monitored as his AH may still worsen and eventually warrant a trial of steroids. Continue supportive care and fluid management of pancreatitis and associated anasarca as per his primary team. We discussed with him the importance of long-term alcohol abstinence and he was open to referral to an outpatient alcohol RPP as well as initiation of AUD pharmacotherapy such as with acamprosate once nearing discharge.    Our team will continue to follow. Please don't hesitate to call with any questions or concerns.    Melva Hernández M.D., Ph.D.  Transplant Hepatology
38 year old male with no significant PMH who presented to the hospital with pancreatitis and hypertriglyceridemia.   Endocrinology consulted for hypertriglyceridemia.  Sever elevation of TG with some worrisome features of hypocalcemia, JENNY, and lactic acidosis.  TG is from 10/27, please repeat TG level.  If TG less than 1000, we can start fibrate and Lovaza, would hold statin for now.  Rest of the plan as per fellow's note.  complex case requiring complex decision making.

## 2024-11-01 NOTE — DIETITIAN INITIAL EVALUATION ADULT - PERTINENT MEDS FT
MEDICATIONS  (STANDING):  acetaminophen     Tablet .. 650 milliGRAM(s) Oral every 6 hours  fenofibrate Tablet 145 milliGRAM(s) Oral daily  influenza   Vaccine 0.5 milliLiter(s) IntraMuscular once  omega-3-Acid Ethyl Esters 2 Gram(s) Oral two times a day  PHENobarbital 64.8 milliGRAM(s) Oral every 24 hours  PHENobarbital   Oral     MEDICATIONS  (PRN):  lidocaine   4% Patch 1 Patch Transdermal daily PRN pain  oxyCODONE    IR 5 milliGRAM(s) Oral every 6 hours PRN Moderate Pain (4 - 6)  oxyCODONE    IR 10 milliGRAM(s) Oral every 6 hours PRN Severe Pain (7 - 10)

## 2024-11-02 ENCOUNTER — TRANSCRIPTION ENCOUNTER (OUTPATIENT)
Age: 39
End: 2024-11-02

## 2024-11-02 LAB
ALBUMIN SERPL ELPH-MCNC: 2.6 G/DL — LOW (ref 3.3–5)
ALP SERPL-CCNC: 171 U/L — HIGH (ref 40–120)
ALT FLD-CCNC: 112 U/L — HIGH (ref 10–45)
ANION GAP SERPL CALC-SCNC: 13 MMOL/L — SIGNIFICANT CHANGE UP (ref 5–17)
APTT BLD: 33.1 SEC — SIGNIFICANT CHANGE UP (ref 24.5–35.6)
AST SERPL-CCNC: 157 U/L — HIGH (ref 10–40)
BILIRUB DIRECT SERPL-MCNC: 1.6 MG/DL — HIGH (ref 0–0.3)
BILIRUB INDIRECT FLD-MCNC: 1 MG/DL — SIGNIFICANT CHANGE UP (ref 0.2–1)
BILIRUB SERPL-MCNC: 2.6 MG/DL — HIGH (ref 0.2–1.2)
BUN SERPL-MCNC: 8 MG/DL — SIGNIFICANT CHANGE UP (ref 7–23)
CA-I BLD-SCNC: 1.08 MMOL/L — LOW (ref 1.15–1.33)
CALCIUM SERPL-MCNC: 7.9 MG/DL — LOW (ref 8.4–10.5)
CHLORIDE SERPL-SCNC: 94 MMOL/L — LOW (ref 96–108)
CO2 SERPL-SCNC: 24 MMOL/L — SIGNIFICANT CHANGE UP (ref 22–31)
CREAT SERPL-MCNC: 0.56 MG/DL — SIGNIFICANT CHANGE UP (ref 0.5–1.3)
CULTURE RESULTS: SIGNIFICANT CHANGE UP
CULTURE RESULTS: SIGNIFICANT CHANGE UP
EGFR: 129 ML/MIN/1.73M2 — SIGNIFICANT CHANGE UP
GLUCOSE SERPL-MCNC: 108 MG/DL — HIGH (ref 70–99)
INR BLD: 1.12 RATIO — SIGNIFICANT CHANGE UP (ref 0.85–1.16)
MAGNESIUM SERPL-MCNC: 2.2 MG/DL — SIGNIFICANT CHANGE UP (ref 1.6–2.6)
PHOSPHATE SERPL-MCNC: 2.2 MG/DL — LOW (ref 2.5–4.5)
POTASSIUM SERPL-MCNC: 4 MMOL/L — SIGNIFICANT CHANGE UP (ref 3.5–5.3)
POTASSIUM SERPL-SCNC: 4 MMOL/L — SIGNIFICANT CHANGE UP (ref 3.5–5.3)
PROT SERPL-MCNC: 6.2 G/DL — SIGNIFICANT CHANGE UP (ref 6–8.3)
PROTHROM AB SERPL-ACNC: 12.9 SEC — SIGNIFICANT CHANGE UP (ref 9.9–13.4)
SODIUM SERPL-SCNC: 131 MMOL/L — LOW (ref 135–145)
SPECIMEN SOURCE: SIGNIFICANT CHANGE UP
SPECIMEN SOURCE: SIGNIFICANT CHANGE UP

## 2024-11-02 RX ORDER — CALCIUM GLUCONATE 98 MG/ML
2 INJECTION, SOLUTION INTRAVENOUS ONCE
Refills: 0 | Status: COMPLETED | OUTPATIENT
Start: 2024-11-02 | End: 2024-11-02

## 2024-11-02 RX ORDER — FENOFIBRATE 145 MG/1
1 TABLET, FILM COATED ORAL
Qty: 30 | Refills: 0
Start: 2024-11-02 | End: 2024-12-01

## 2024-11-02 RX ORDER — OMEGA-3/EPA/FISH OIL 910-1300MG
2 CAPSULE,DELAYED RELEASE (ENTERIC COATED) ORAL
Qty: 120 | Refills: 0
Start: 2024-11-02 | End: 2024-12-01

## 2024-11-02 RX ORDER — ACAMPROSATE CALCIUM ENTERIC-COATED 333 MG/1
2 TABLET, DELAYED RELEASE ORAL
Qty: 180 | Refills: 0
Start: 2024-11-02 | End: 2024-12-01

## 2024-11-02 RX ADMIN — OXYCODONE HYDROCHLORIDE 10 MILLIGRAM(S): 30 TABLET ORAL at 21:40

## 2024-11-02 RX ADMIN — Medication 650 MILLIGRAM(S): at 08:52

## 2024-11-02 RX ADMIN — Medication 650 MILLIGRAM(S): at 13:12

## 2024-11-02 RX ADMIN — OXYCODONE HYDROCHLORIDE 10 MILLIGRAM(S): 30 TABLET ORAL at 21:10

## 2024-11-02 RX ADMIN — Medication 650 MILLIGRAM(S): at 08:22

## 2024-11-02 RX ADMIN — Medication 2 GRAM(S): at 17:37

## 2024-11-02 RX ADMIN — OXYCODONE HYDROCHLORIDE 10 MILLIGRAM(S): 30 TABLET ORAL at 08:52

## 2024-11-02 RX ADMIN — OXYCODONE HYDROCHLORIDE 10 MILLIGRAM(S): 30 TABLET ORAL at 03:03

## 2024-11-02 RX ADMIN — FENOFIBRATE 145 MILLIGRAM(S): 145 TABLET, FILM COATED ORAL at 12:41

## 2024-11-02 RX ADMIN — Medication 2 GRAM(S): at 05:24

## 2024-11-02 RX ADMIN — OXYCODONE HYDROCHLORIDE 10 MILLIGRAM(S): 30 TABLET ORAL at 08:22

## 2024-11-02 RX ADMIN — Medication 650 MILLIGRAM(S): at 02:03

## 2024-11-02 RX ADMIN — OXYCODONE HYDROCHLORIDE 10 MILLIGRAM(S): 30 TABLET ORAL at 14:43

## 2024-11-02 RX ADMIN — Medication 40 MILLIGRAM(S): at 05:24

## 2024-11-02 RX ADMIN — Medication 650 MILLIGRAM(S): at 20:33

## 2024-11-02 RX ADMIN — OXYCODONE HYDROCHLORIDE 10 MILLIGRAM(S): 30 TABLET ORAL at 02:03

## 2024-11-02 RX ADMIN — OXYCODONE HYDROCHLORIDE 10 MILLIGRAM(S): 30 TABLET ORAL at 15:13

## 2024-11-02 RX ADMIN — Medication 650 MILLIGRAM(S): at 21:40

## 2024-11-02 RX ADMIN — Medication 650 MILLIGRAM(S): at 03:03

## 2024-11-02 RX ADMIN — Medication 650 MILLIGRAM(S): at 13:42

## 2024-11-02 RX ADMIN — LIDOCAINE HYDROCHLORIDE 1 PATCH: 40 SOLUTION TOPICAL at 07:00

## 2024-11-02 RX ADMIN — CALCIUM GLUCONATE 200 GRAM(S): 98 INJECTION, SOLUTION INTRAVENOUS at 18:12

## 2024-11-02 RX ADMIN — LIDOCAINE HYDROCHLORIDE 1 PATCH: 40 SOLUTION TOPICAL at 00:02

## 2024-11-02 RX ADMIN — LIDOCAINE HYDROCHLORIDE 1 PATCH: 40 SOLUTION TOPICAL at 13:11

## 2024-11-02 NOTE — DISCHARGE NOTE PROVIDER - NSDCMRMEDTOKEN_GEN_ALL_CORE_FT
acamprosate 333 mg oral delayed release tablet: 2 tab(s) orally 3 times a day MDD: 6 tabs  fenofibrate 145 mg oral tablet: 1 tab(s) orally once a day MDD: 1 tab  omega-3 polyunsaturated fatty acids ethyl esters 1000 mg oral capsule: 2 cap(s) orally 2 times a day MDD: 4 caps   acamprosate 333 mg oral delayed release tablet: 2 tab(s) orally 3 times a day MDD: 6 tabs  bloodwork: CBC, ionized calcium, procalcitonin: dx: K85.96  fenofibrate 145 mg oral tablet: 1 tab(s) orally once a day MDD: 1 tab  omega-3 polyunsaturated fatty acids ethyl esters 1000 mg oral capsule: 2 cap(s) orally 2 times a day MDD: 4 caps   acamprosate 333 mg oral delayed release tablet: 2 tab(s) orally 3 times a day MDD: 6 tabs  fenofibrate 145 mg oral tablet: 1 tab(s) orally once a day MDD: 1 tab  omega-3 polyunsaturated fatty acids ethyl esters 1000 mg oral capsule: 2 cap(s) orally 2 times a day MDD: 4 caps  oxyCODONE 5 mg oral tablet: 1 tab(s) orally every 6 hours as needed for  severe pain MDD: 4

## 2024-11-02 NOTE — DISCHARGE NOTE PROVIDER - NSDCFUADDINST_GEN_ALL_CORE_FT
Alcohol use: we have prescribed acamprosate, which is a medication used to reduce alcohol use. Please take as prescribed and plan to follow up with hepatology clinic  PAIN: You may continue to take Acetaminophen (Tylenol) and Ibuprofen (Advil, Motrin) over the counter for pain as needed. You can alternate the two medications, giving one every 3 hours  ACTIVITY: normal activity level as tolerated.  Diet: regular diet  NOTIFY YOUR PEDIATRICIAN FOR: Any fever (over 100.5 F) or his/her pain is not controlled on their discharge pain medications, any new or worsening non-emergency symptoms.  RETURN TO ED: If any change in mental status (drowsy, non-responsive), persistent vomiting  FOLLOW-UP:   - Please follow up with your primary care physician in 1-2 weeks regarding your hospitalization.   - Dr. Knox at endocrine lipid clinic - [phone] [facility][note 3][address], [address], [place] [address]   - Dr. Hernández at hepatology clinic - call for appt with above contact info and for referral to outpatient recovery program  - Alcoholics Anonymous - contact information is provided Alcohol use: we have prescribed acamprosate, which is a medication used to reduce alcohol use. Please take as prescribed and plan to follow up with hepatology clinic  Scrotal edema: can continue to use scrotal support underwear as needed  PAIN: You may continue to take Acetaminophen (Tylenol) and Ibuprofen (Advil, Motrin) over the counter for pain as needed. You can alternate the two medications, giving one every 3 hours  ACTIVITY: normal activity level as tolerated.  Diet: regular diet  NOTIFY YOUR PEDIATRICIAN FOR: Any fever (over 100.5 F) or his/her pain is not controlled on their discharge pain medications, any new or worsening non-emergency symptoms.  RETURN TO ED: If any change in mental status (drowsy, non-responsive), persistent vomiting  FOLLOW-UP:   - Please follow up with your primary care physician in 1-2 weeks regarding your hospitalization.   - Dr. Knox at endocrine lipid clinic - [phone] [address] [facility], [address] [place] [id number]   - Dr. Hernández at hepatology clinic - call for appt with above contact info and for referral to outpatient recovery program  - Alcoholics Anonymous - contact information is provided

## 2024-11-02 NOTE — PROGRESS NOTE ADULT - SUBJECTIVE AND OBJECTIVE BOX
SUBJECTIVE / OVERNIGHT EVENTS:pt seen and examined  11-2-24    MEDICATIONS  (STANDING):  acetaminophen     Tablet .. 650 milliGRAM(s) Oral every 6 hours  enoxaparin Injectable 40 milliGRAM(s) SubCutaneous every 24 hours  fenofibrate Tablet 145 milliGRAM(s) Oral daily  influenza   Vaccine 0.5 milliLiter(s) IntraMuscular once  omega-3-Acid Ethyl Esters 2 Gram(s) Oral two times a day    MEDICATIONS  (PRN):  lidocaine   4% Patch 1 Patch Transdermal daily PRN pain  oxyCODONE    IR 10 milliGRAM(s) Oral every 6 hours PRN Severe Pain (7 - 10)  oxyCODONE    IR 5 milliGRAM(s) Oral every 6 hours PRN Moderate Pain (4 - 6)    Vital Signs Last 24 Hrs  T(C): 36.9 (11-02-24 @ 08:32), Max: 37.7 (11-01-24 @ 17:24)  T(F): 98.5 (11-02-24 @ 08:32), Max: 99.8 (11-01-24 @ 17:24)  HR: 99 (11-02-24 @ 08:32) (93 - 104)  BP: 141/91 (11-02-24 @ 05:33) (129/89 - 141/98)  BP(mean): --  RR: 18 (11-02-24 @ 08:32) (18 - 18)  SpO2: 97% (11-02-24 @ 08:32) (94% - 97%)          Constitutional: No fever, fatigue  Skin: No rash.  Eyes: No recent vision problems or eye pain.  ENT: No congestion, ear pain, or sore throat.  Cardiovascular: No chest pain or palpation.  Respiratory: No cough, shortness of breath, congestion, or wheezing.  Gastrointestinal: dec  abdominal pain, nausea, vomiting, or diarrhea.  Genitourinary: No dysuria.  Musculoskeletal: No joint swelling.  Neurologic: No headache.    PHYSICAL EXAM:  GENERAL: NAD  EYES: EOMI, PERRLA  NECK: Supple, No JVD  CHEST/LUNG: dec breath sounds at bases  HEART:  S1 , S2 +  ABDOMEN: soft , mild distension+, tenderness on deep palpation+, no guarding, bs+  EXTREMITIES:  no edema  NEUROLOGY:alert awake oriented     LABS:  11-02    131[L]  |  94[L]  |  8   ----------------------------<  108[H]  4.0   |  24  |  0.56    Ca    7.9[L]      02 Nov 2024 06:39  Phos  2.2     11-02  Mg     2.2     11-02    TPro  6.2  /  Alb  2.6[L]  /  TBili  2.6[H]  /  DBili  1.6[H]  /  AST  157[H]  /  ALT  112[H]  /  AlkPhos  171[H]  11-02    Creatinine Trend: 0.56 <--, 0.60 <--, 0.72 <--, 0.70 <--, 0.73 <--, 0.77 <--, 0.81 <--, 0.79 <--, 1.04 <--, 1.37 <--, 1.74 <--, 2.46 <--, 2.43 <--, 1.42 <--, 1.31 <--, 1.17 <--                        10.2   14.44 )-----------( 163      ( 01 Nov 2024 07:04 )             29.4     Urine Studies:  Urinalysis Basic - ( 02 Nov 2024 06:39 )    Color:  / Appearance:  / SG:  / pH:   Gluc: 108 mg/dL / Ketone:   / Bili:  / Urobili:    Blood:  / Protein:  / Nitrite:    Leuk Esterase:  / RBC:  / WBC    Sq Epi:  / Non Sq Epi:  / Bacteria:       Osmolality, Random Urine: 343 mos/kg (10-31 @ 06:38)  Sodium, Random Urine: 5 mmol/L (10-31 @ 06:38)  Creatinine, Random Urine: 75 mg/dL (10-31 @ 06:38)  Sodium, Random Urine: 10 mmol/L (10-29 @ 03:39)  Osmolality, Random Urine: 385 mos/kg (10-29 @ 03:39)  Creatinine, Random Urine: 218 mg/dL (10-29 @ 03:39)          LIVER FUNCTIONS - ( 02 Nov 2024 06:39 )  Alb: 2.6 g/dL / Pro: 6.2 g/dL / ALK PHOS: 171 U/L / ALT: 112 U/L / AST: 157 U/L / GGT: x           PT/INR - ( 02 Nov 2024 06:40 )   PT: 12.9 sec;   INR: 1.12 ratio         PTT - ( 02 Nov 2024 06:40 )  PTT:33.1 sec      Osmolality, Random Urine: 343 mos/kg (10-31 @ 06:38)  Sodium, Random Urine: 5 mmol/L (10-31 @ 06:38)  Creatinine, Random Urine: 75 mg/dL (10-31 @ 06:38)  Sodium, Random Urine: 10 mmol/L (10-29 @ 03:39)  Osmolality, Random Urine: 385 mos/kg (10-29 @ 03:39)  Creatinine, Random Urine: 218 mg/dL (10-29 @ 03:39)          LIVER FUNCTIONS - ( 01 Nov 2024 07:04 )  Alb: 2.5 g/dL / Pro: 5.9 g/dL / ALK PHOS: 142 U/L / ALT: 112 U/L / AST: 232 U/L / GGT: x                 Osmolality, Random Urine: 343 mos/kg (10-31 @ 06:38)  Sodium, Random Urine: 5 mmol/L (10-31 @ 06:38)  Creatinine, Random Urine: 75 mg/dL (10-31 @ 06:38)  Sodium, Random Urine: 10 mmol/L (10-29 @ 03:39)  Osmolality, Random Urine: 385 mos/kg (10-29 @ 03:39)  Creatinine, Random Urine: 218 mg/dL (10-29 @ 03:39)          LIVER FUNCTIONS - ( 31 Oct 2024 06:35 )  Alb: 2.5 g/dL / Pro: 5.5 g/dL / ALK PHOS: 98 U/L / ALT: 77 U/L / AST: 134 U/L / GGT: x                   RADIOLOGY & ADDITIONAL TESTS:    Imaging Personally Reviewed:    Consultant(s) Notes Reviewed:      Care Discussed with Consultants/Other Providers:

## 2024-11-02 NOTE — DISCHARGE NOTE PROVIDER - NSDCCPCAREPLAN_GEN_ALL_CORE_FT
PRINCIPAL DISCHARGE DIAGNOSIS  Diagnosis: Acute pancreatitis  Assessment and Plan of Treatment: Nonop     PRINCIPAL DISCHARGE DIAGNOSIS  Diagnosis: Acute pancreatitis  Assessment and Plan of Treatment: Nonop  Notify your surgeon and return to ER for temperatures greater than 101, chills sweats, pain not controlled with pain medications, persistent nausea and vomiting, inability to tolerate food, significant abdominal bloating/distension, no passing of flatus or bowel movements, or acutely concerning matters to you, that may require urgent medical attention.   Please follow up with your PMD within 1 week regarding your hospitalization. you should repeat blood work at the end of the week for CBC, ionized calcium.     PRINCIPAL DISCHARGE DIAGNOSIS  Diagnosis: Acute pancreatitis  Assessment and Plan of Treatment: Nonop  Notify your surgeon and return to ER for temperatures greater than 101, chills sweats, pain not controlled with pain medications, persistent nausea and vomiting, inability to tolerate food, significant abdominal bloating/distension, no passing of flatus or bowel movements, or acutely concerning matters to you, that may require urgent medical attention.   Please follow up with your PMD within 1 week regarding your hospitalization. you should repeat blood work at the end of the week for CBC, ionized calcium, and procalcitonin. RX  given to patient as he says he has a PMD but is trying to establish care with a new one.

## 2024-11-02 NOTE — DISCHARGE NOTE PROVIDER - CARE PROVIDER_API CALL
Melva Hernández  Transplant Hepatology  [address] [place] [id number]  Phone: [phone]  Fax: [phone]  Follow Up Time: 2 weeks

## 2024-11-02 NOTE — DISCHARGE NOTE PROVIDER - NSDCFUADDAPPT_GEN_ALL_CORE_FT
Patient will need follow up with [facility] lipid clinic - Dr. Angel Knox [phone] [address] [facility], [address], [place] [address]   - please call for appt   Patient will need follow up with [facility] lipid clinic - Dr. Angel Knox [phone] [address] [facility], [address], [place] [address]   - please call for appt    Please follow up with your primary care provider 1 week after discharge. The primary care provider should obtain lab work including CBC, Ionized Calcium, and pro     Patient will need follow up with [facility] lipid clinic - Dr. Angel Knox [phone] [address] [facility], [address], [place] [address]   - please call for appt    Please follow up with your primary care provider 1 week after discharge. The primary care provider should obtain lab work including CBC, Ionized Calcium, and procalcitonin     Patient will need follow up with [facility] lipid clinic - Dr. Angel Knox [phone] [address] [facility], [address], [place] [address]   - please call for appt    Please follow up with your primary care provider 1 week after discharge. The primary care provider should obtain lab work including CBC, Ionized Calcium, and procalcitonin.

## 2024-11-02 NOTE — PROGRESS NOTE ADULT - SUBJECTIVE AND OBJECTIVE BOX
SURGERY DAILY PROGRESS NOTE    24 Hour/Overnight Events: No acute events overnight    SUBJECTIVE: Pt feeling well, pain has resolved. He is tolerating his diet    ------------------------------------------------------------------------------------------------------------  OBJECTIVE:  Vital Signs Last 24 Hrs  T(C): 36.9 (02 Nov 2024 08:32), Max: 37.7 (01 Nov 2024 17:24)  T(F): 98.5 (02 Nov 2024 08:32), Max: 99.8 (01 Nov 2024 17:24)  HR: 99 (02 Nov 2024 08:32) (94 - 104)  BP: 141/91 (02 Nov 2024 05:33) (129/89 - 141/98)  BP(mean): --  RR: 18 (02 Nov 2024 08:32) (18 - 18)  SpO2: 97% (02 Nov 2024 08:32) (94% - 97%)    Parameters below as of 02 Nov 2024 08:32  Patient On (Oxygen Delivery Method): room air      I&O's Detail    01 Nov 2024 07:01  -  02 Nov 2024 07:00  --------------------------------------------------------  IN:    Oral Fluid: 880 mL  Total IN: 880 mL    OUT:    Voided (mL): 2250 mL  Total OUT: 2250 mL    Total NET: -1370 mL          LABS:                        10.2   14.44 )-----------( 163      ( 01 Nov 2024 07:04 )             29.4     11-02    131[L]  |  94[L]  |  8   ----------------------------<  108[H]  4.0   |  24  |  0.56    Ca    7.9[L]      02 Nov 2024 06:39  Phos  2.2     11-02  Mg     2.2     11-02    TPro  6.2  /  Alb  2.6[L]  /  TBili  2.6[H]  /  DBili  1.6[H]  /  AST  157[H]  /  ALT  112[H]  /  AlkPhos  171[H]  11-02    LIVER FUNCTIONS - ( 02 Nov 2024 06:39 )  Alb: 2.6 g/dL / Pro: 6.2 g/dL / ALK PHOS: 171 U/L / ALT: 112 U/L / AST: 157 U/L / GGT: x           PT/INR - ( 02 Nov 2024 06:40 )   PT: 12.9 sec;   INR: 1.12 ratio         PTT - ( 02 Nov 2024 06:40 )  PTT:33.1 sec  Urinalysis Basic - ( 02 Nov 2024 06:39 )    Color: x / Appearance: x / SG: x / pH: x  Gluc: 108 mg/dL / Ketone: x  / Bili: x / Urobili: x   Blood: x / Protein: x / Nitrite: x   Leuk Esterase: x / RBC: x / WBC x   Sq Epi: x / Non Sq Epi: x / Bacteria: x        PHYSICAL EXAM:  GEN: resting in bed comfortably in NAD  RESP: no increased WOB, no tachypnea, on RA  ABD: soft, non-distended abdomen. Mild tenderness to palpation in epigastrium. No rebound, guarding or rigidity.   Groin: Scrotum edematous, no skin changes or warmth.   EXTR: warm, well-perfused without gross deformities; spontaneous movement in b/l U/L extrem  NEURO: awake, alert      Assessment  38 y M with no significant pmh, presenting with acute onset abdominal pain that woke him up this morning, No associated nausea, vomiting, fevers or chills. Patient reports having 5 drink on the weekend, In the ED, patient was afebrile, VSS, WBC 10. Na 129, T bili 2.4, Alk phos 172, , , lipase 1122, lactate 5.2. CT showed gallbladder sludge, normal CBD, acute interstitial edematous pancreatitis.  Admitted to surgery service for gallstone pancreatitis.     Plan:  - pt stable for discharge from surgical perspective  - f/u hepatology plans for possible steroids  - GI consulted, f/u recs  - Diet: Reg, tolerating  - Phenobarb taper completed  - Pain control PRN   - Monitor UOP  - Replete electrolytes PRN   - supportive underwear for scrotal edema    SURGERY DAILY PROGRESS NOTE    24 Hour/Overnight Events: No acute events overnight    SUBJECTIVE: Pt feeling well, pain has resolved. He is tolerating his diet    ------------------------------------------------------------------------------------------------------------  OBJECTIVE:  Vital Signs Last 24 Hrs  T(C): 36.9 (02 Nov 2024 08:32), Max: 37.7 (01 Nov 2024 17:24)  T(F): 98.5 (02 Nov 2024 08:32), Max: 99.8 (01 Nov 2024 17:24)  HR: 99 (02 Nov 2024 08:32) (94 - 104)  BP: 141/91 (02 Nov 2024 05:33) (129/89 - 141/98)  BP(mean): --  RR: 18 (02 Nov 2024 08:32) (18 - 18)  SpO2: 97% (02 Nov 2024 08:32) (94% - 97%)    Parameters below as of 02 Nov 2024 08:32  Patient On (Oxygen Delivery Method): room air      I&O's Detail    01 Nov 2024 07:01  -  02 Nov 2024 07:00  --------------------------------------------------------  IN:    Oral Fluid: 880 mL  Total IN: 880 mL    OUT:    Voided (mL): 2250 mL  Total OUT: 2250 mL    Total NET: -1370 mL          LABS:                        10.2   14.44 )-----------( 163      ( 01 Nov 2024 07:04 )             29.4     11-02    131[L]  |  94[L]  |  8   ----------------------------<  108[H]  4.0   |  24  |  0.56    Ca    7.9[L]      02 Nov 2024 06:39  Phos  2.2     11-02  Mg     2.2     11-02    TPro  6.2  /  Alb  2.6[L]  /  TBili  2.6[H]  /  DBili  1.6[H]  /  AST  157[H]  /  ALT  112[H]  /  AlkPhos  171[H]  11-02    LIVER FUNCTIONS - ( 02 Nov 2024 06:39 )  Alb: 2.6 g/dL / Pro: 6.2 g/dL / ALK PHOS: 171 U/L / ALT: 112 U/L / AST: 157 U/L / GGT: x           PT/INR - ( 02 Nov 2024 06:40 )   PT: 12.9 sec;   INR: 1.12 ratio         PTT - ( 02 Nov 2024 06:40 )  PTT:33.1 sec  Urinalysis Basic - ( 02 Nov 2024 06:39 )    Color: x / Appearance: x / SG: x / pH: x  Gluc: 108 mg/dL / Ketone: x  / Bili: x / Urobili: x   Blood: x / Protein: x / Nitrite: x   Leuk Esterase: x / RBC: x / WBC x   Sq Epi: x / Non Sq Epi: x / Bacteria: x        PHYSICAL EXAM:  GEN: resting in bed comfortably in NAD  RESP: no increased WOB, no tachypnea, on RA  ABD: soft, non-distended abdomen. Mild tenderness to palpation in epigastrium. No rebound, guarding or rigidity.   Groin: Scrotum edematous, no skin changes or warmth.   EXTR: warm, well-perfused without gross deformities; spontaneous movement in b/l U/L extrem  NEURO: awake, alert      Assessment  38 y M with no significant pmh, presenting with acute onset abdominal pain that woke him up this morning, No associated nausea, vomiting, fevers or chills. Patient reports having 5 drink on the weekend, In the ED, patient was afebrile, VSS, WBC 10. Na 129, T bili 2.4, Alk phos 172, , , lipase 1122, lactate 5.2. CT showed gallbladder sludge, normal CBD, acute interstitial edematous pancreatitis.  Admitted to surgery service for gallstone pancreatitis.     Plan:  - pt stable for discharge from surgical perspective  - f/u hepatology plans for possible steroids     - no plan for steroids as bili improving  - GI consulted, f/u recs  - Diet: Reg, tolerating  - Phenobarb taper completed  - Pain control PRN   - Monitor UOP  - Replete electrolytes PRN   - supportive underwear for scrotal edema

## 2024-11-02 NOTE — DISCHARGE NOTE PROVIDER - HOSPITAL COURSE
38 y M with no significant pmh, presenting with acute onset abdominal pain that woke him up this morning, No associated nausea, vomiting, fevers or chills. Patient reports having 5 drink on the weekend, In the ED, patient was afebrile, VSS, WBC 10. Na 129, T bili 2.4, Alk phos 172, , , lipase 1122, lactate 5.2. CT showed gallbladder sludge, normal CBD, acute interstitial edematous pancreatitis. Started on fluids and clears with lactate resolving. Ca level consistently low so repleted with calcium gluconate multiple times, likely related to absorption by pancreatitis.    CIWA protocol with phenobarb taper started on admission due to hx of alcohol use, no withdraw symptoms throughout stay    Endocrine was consulted for hypertriglyceridemia to 1649 on admission and recommended starting fibrate, Lovaza, and repeat level in case SICU admission needed for insulin drip. Repeat triglycerides were 500 so no need to start insulin drip.  - Patient will need follow up with [facility] lipid clinic, Dr. Angel Knox [phone] [address], [address], [place] [address]    Pt found to have pancreatitis possibly related to gallstone, but also hx significant for alcohol use with AST > ALT. Hepatology eval for hyperbili as well with Tbili to 4 and direct dili to 3 for multiple draws. Before discharge decreased to T bili 2.6 and direct bili 1.6, so no need for any steroids from hepatology perspective. Recommended acamprosate and plan to follow up outpatient. Also discussed withholding from any alcohol use. 38 y M with no significant pmh, presenting with acute onset abdominal pain that woke him up this morning, No associated nausea, vomiting, fevers or chills. Patient reports having 5 drink on the weekend, In the ED, patient was afebrile, VSS, WBC 10. Na 129, T bili 2.4, Alk phos 172, , , lipase 1122, lactate 5.2. CT showed gallbladder sludge, normal CBD, acute interstitial edematous pancreatitis. Started on fluids and clears with lactate resolving. Ca level consistently low so repleted with calcium gluconate multiple times, likely related to absorption by pancreatitis.    CIWA protocol with phenobarb taper started on admission due to hx of alcohol use, no withdraw symptoms throughout stay.    Developed scrotal edema from fluid overload, was provided with scrotal support underwear and swelling decreased after dcing fluids. No interventions or follow up needed    Endocrine was consulted for hypertriglyceridemia to 1649 on admission and recommended starting fibrate, Lovaza, and repeat level in case SICU admission needed for insulin drip. Repeat triglycerides were 500 so no need to start insulin drip.  - Patient will need follow up with [facility] lipid clinic, Dr. Angel Knox [phone] [address], [address], [place] [address]    Pt found to have pancreatitis possibly related to gallstone, but also hx significant for alcohol use with AST > ALT. Hepatology eval for hyperbili as well with Tbili to 4 and direct dili to 3 for multiple draws. Before discharge decreased to T bili 2.6 and direct bili 1.6, so no need for any steroids from hepatology perspective. Recommended acamprosate and plan to follow up outpatient. Also discussed withholding from any alcohol use. 38 y M with no significant pmh, presenting with acute onset abdominal pain that woke him up this morning, No associated nausea, vomiting, fevers or chills. Patient reports having 5 drink on the weekend, In the ED, patient was afebrile, VSS, WBC 10. Na 129, T bili 2.4, Alk phos 172, , , lipase 1122, lactate 5.2. CT showed gallbladder sludge, normal CBD, acute interstitial edematous pancreatitis. Started on fluids and clears with lactate resolving. Ca level consistently low so repleted with calcium gluconate multiple times, likely related to absorption by pancreatitis.    CIWA protocol with phenobarb taper started on admission due to hx of alcohol use, no withdrawal symptoms throughout stay.    Developed scrotal edema from fluid overload, was provided with scrotal support underwear and swelling decreased after discontinuing fluids. No interventions or follow up needed.     Endocrine was consulted for hypertriglyceridemia to 1649 on admission and recommended starting fibrate, Lovaza, and repeat level in case SICU admission needed for insulin drip. Repeat triglycerides were 500 so no need to start insulin drip.  - Patient will need follow up with [facility] lipid clinic, Dr. Angel Knox [phone] [address], [address], [place] [address]    Pt found to have pancreatitis possibly related to gallstone, but also hx significant for alcohol use with AST > ALT. Hepatology eval for hyperbili as well with Tbili to 4 and direct dili to 3 for multiple draws. Before discharge decreased to T bili 2.6 and direct bili 1.6, so no need for any steroids from hepatology perspective. Recommended acamprosate and plan to follow up outpatient. Also discussed withholding from any alcohol use.

## 2024-11-02 NOTE — DISCHARGE NOTE PROVIDER - NSFOLLOWUPCLINICS_GEN_ALL_ED_FT
Alcoholics Anonymous - 24 hour hotline  Alcoholics Anonymous  .  [place]   Phone: [phone]  Fax:   Follow Up Time: 2 weeks

## 2024-11-03 LAB
ALBUMIN SERPL ELPH-MCNC: 2.7 G/DL — LOW (ref 3.3–5)
ALP SERPL-CCNC: 175 U/L — HIGH (ref 40–120)
ALT FLD-CCNC: 94 U/L — HIGH (ref 10–45)
ANION GAP SERPL CALC-SCNC: 14 MMOL/L — SIGNIFICANT CHANGE UP (ref 5–17)
APPEARANCE UR: CLEAR — SIGNIFICANT CHANGE UP
AST SERPL-CCNC: 99 U/L — HIGH (ref 10–40)
BILIRUB DIRECT SERPL-MCNC: 1.1 MG/DL — HIGH (ref 0–0.3)
BILIRUB INDIRECT FLD-MCNC: 0.8 MG/DL — SIGNIFICANT CHANGE UP (ref 0.2–1)
BILIRUB SERPL-MCNC: 1.9 MG/DL — HIGH (ref 0.2–1.2)
BILIRUB UR-MCNC: ABNORMAL
BUN SERPL-MCNC: 7 MG/DL — SIGNIFICANT CHANGE UP (ref 7–23)
CA-I BLD-SCNC: 1.11 MMOL/L — LOW (ref 1.15–1.33)
CALCIUM SERPL-MCNC: 8 MG/DL — LOW (ref 8.4–10.5)
CHLORIDE SERPL-SCNC: 93 MMOL/L — LOW (ref 96–108)
CO2 SERPL-SCNC: 24 MMOL/L — SIGNIFICANT CHANGE UP (ref 22–31)
COLOR SPEC: SIGNIFICANT CHANGE UP
CREAT SERPL-MCNC: 0.53 MG/DL — SIGNIFICANT CHANGE UP (ref 0.5–1.3)
DIFF PNL FLD: NEGATIVE — SIGNIFICANT CHANGE UP
EGFR: 132 ML/MIN/1.73M2 — SIGNIFICANT CHANGE UP
GLUCOSE SERPL-MCNC: 105 MG/DL — HIGH (ref 70–99)
GLUCOSE UR QL: NEGATIVE MG/DL — SIGNIFICANT CHANGE UP
HCT VFR BLD CALC: 29.5 % — LOW (ref 39–50)
HGB BLD-MCNC: 10 G/DL — LOW (ref 13–17)
KETONES UR-MCNC: 15 MG/DL
LEUKOCYTE ESTERASE UR-ACNC: NEGATIVE — SIGNIFICANT CHANGE UP
MAGNESIUM SERPL-MCNC: 2 MG/DL — SIGNIFICANT CHANGE UP (ref 1.6–2.6)
MCHC RBC-ENTMCNC: 27.6 PG — SIGNIFICANT CHANGE UP (ref 27–34)
MCHC RBC-ENTMCNC: 33.9 G/DL — SIGNIFICANT CHANGE UP (ref 32–36)
MCV RBC AUTO: 81.5 FL — SIGNIFICANT CHANGE UP (ref 80–100)
NITRITE UR-MCNC: NEGATIVE — SIGNIFICANT CHANGE UP
NRBC # BLD: 0 /100 WBCS — SIGNIFICANT CHANGE UP (ref 0–0)
PH UR: 6.5 — SIGNIFICANT CHANGE UP (ref 5–8)
PHOSPHATE SERPL-MCNC: 1.8 MG/DL — LOW (ref 2.5–4.5)
PLATELET # BLD AUTO: 360 K/UL — SIGNIFICANT CHANGE UP (ref 150–400)
POTASSIUM SERPL-MCNC: 3.7 MMOL/L — SIGNIFICANT CHANGE UP (ref 3.5–5.3)
POTASSIUM SERPL-SCNC: 3.7 MMOL/L — SIGNIFICANT CHANGE UP (ref 3.5–5.3)
PROT SERPL-MCNC: 6.3 G/DL — SIGNIFICANT CHANGE UP (ref 6–8.3)
PROT UR-MCNC: NEGATIVE MG/DL — SIGNIFICANT CHANGE UP
RBC # BLD: 3.62 M/UL — LOW (ref 4.2–5.8)
RBC # FLD: 16.9 % — HIGH (ref 10.3–14.5)
SODIUM SERPL-SCNC: 131 MMOL/L — LOW (ref 135–145)
SP GR SPEC: 1.01 — SIGNIFICANT CHANGE UP (ref 1–1.03)
UROBILINOGEN FLD QL: 1 MG/DL — SIGNIFICANT CHANGE UP (ref 0.2–1)
WBC # BLD: 20.91 K/UL — HIGH (ref 3.8–10.5)
WBC # FLD AUTO: 20.91 K/UL — HIGH (ref 3.8–10.5)

## 2024-11-03 PROCEDURE — 93970 EXTREMITY STUDY: CPT | Mod: 26

## 2024-11-03 PROCEDURE — 71045 X-RAY EXAM CHEST 1 VIEW: CPT | Mod: 26

## 2024-11-03 RX ADMIN — OXYCODONE HYDROCHLORIDE 10 MILLIGRAM(S): 30 TABLET ORAL at 16:55

## 2024-11-03 RX ADMIN — OXYCODONE HYDROCHLORIDE 10 MILLIGRAM(S): 30 TABLET ORAL at 21:45

## 2024-11-03 RX ADMIN — FENOFIBRATE 145 MILLIGRAM(S): 145 TABLET, FILM COATED ORAL at 11:50

## 2024-11-03 RX ADMIN — OXYCODONE HYDROCHLORIDE 10 MILLIGRAM(S): 30 TABLET ORAL at 02:21

## 2024-11-03 RX ADMIN — OXYCODONE HYDROCHLORIDE 10 MILLIGRAM(S): 30 TABLET ORAL at 10:25

## 2024-11-03 RX ADMIN — LIDOCAINE HYDROCHLORIDE 1 PATCH: 40 SOLUTION TOPICAL at 03:04

## 2024-11-03 RX ADMIN — Medication 650 MILLIGRAM(S): at 21:45

## 2024-11-03 RX ADMIN — Medication 40 MILLIGRAM(S): at 06:23

## 2024-11-03 RX ADMIN — LIDOCAINE HYDROCHLORIDE 1 PATCH: 40 SOLUTION TOPICAL at 18:54

## 2024-11-03 RX ADMIN — OXYCODONE HYDROCHLORIDE 10 MILLIGRAM(S): 30 TABLET ORAL at 02:51

## 2024-11-03 RX ADMIN — OXYCODONE HYDROCHLORIDE 10 MILLIGRAM(S): 30 TABLET ORAL at 09:25

## 2024-11-03 RX ADMIN — Medication 2 GRAM(S): at 18:03

## 2024-11-03 RX ADMIN — Medication 650 MILLIGRAM(S): at 13:59

## 2024-11-03 RX ADMIN — Medication 650 MILLIGRAM(S): at 14:00

## 2024-11-03 RX ADMIN — Medication 650 MILLIGRAM(S): at 07:36

## 2024-11-03 RX ADMIN — OXYCODONE HYDROCHLORIDE 10 MILLIGRAM(S): 30 TABLET ORAL at 15:44

## 2024-11-03 RX ADMIN — Medication 650 MILLIGRAM(S): at 02:51

## 2024-11-03 RX ADMIN — Medication 2 GRAM(S): at 06:22

## 2024-11-03 RX ADMIN — Medication 650 MILLIGRAM(S): at 01:35

## 2024-11-03 RX ADMIN — OXYCODONE HYDROCHLORIDE 10 MILLIGRAM(S): 30 TABLET ORAL at 22:45

## 2024-11-03 RX ADMIN — Medication 650 MILLIGRAM(S): at 22:45

## 2024-11-03 NOTE — PROGRESS NOTE ADULT - SUBJECTIVE AND OBJECTIVE BOX
SURGERY DAILY PROGRESS NOTE    24 Hour/Overnight Events: No acute events overnight    SUBJECTIVE: Pt denies abd pain, tolerating diet. Overall feeling well and hoping to go home today    ------------------------------------------------------------------------------------------------------------  OBJECTIVE:  Vital Signs Last 24 Hrs  T(C): 37.4 (03 Nov 2024 00:53), Max: 37.4 (03 Nov 2024 00:53)  T(F): 99.4 (03 Nov 2024 00:53), Max: 99.4 (03 Nov 2024 00:53)  HR: 99 (03 Nov 2024 00:53) (96 - 100)  BP: 145/98 (03 Nov 2024 00:53) (141/91 - 151/99)  BP(mean): --  RR: 18 (03 Nov 2024 00:53) (18 - 18)  SpO2: 97% (03 Nov 2024 00:53) (95% - 100%)    Parameters below as of 03 Nov 2024 00:53  Patient On (Oxygen Delivery Method): room air      I&O's Detail    01 Nov 2024 07:01  -  02 Nov 2024 07:00  --------------------------------------------------------  IN:    Oral Fluid: 880 mL  Total IN: 880 mL    OUT:    Voided (mL): 2250 mL  Total OUT: 2250 mL    Total NET: -1370 mL      02 Nov 2024 08:01  -  03 Nov 2024 01:18  --------------------------------------------------------  IN:    IV PiggyBack: 100 mL    Oral Fluid: 700 mL  Total IN: 800 mL    OUT:    Voided (mL): 1700 mL  Total OUT: 1700 mL    Total NET: -900 mL          LABS:                        10.2   14.44 )-----------( 163      ( 01 Nov 2024 07:04 )             29.4     11-02    131[L]  |  94[L]  |  8   ----------------------------<  108[H]  4.0   |  24  |  0.56    Ca    7.9[L]      02 Nov 2024 06:39  Phos  2.2     11-02  Mg     2.2     11-02    TPro  6.2  /  Alb  2.6[L]  /  TBili  2.6[H]  /  DBili  1.6[H]  /  AST  157[H]  /  ALT  112[H]  /  AlkPhos  171[H]  11-02    LIVER FUNCTIONS - ( 02 Nov 2024 06:39 )  Alb: 2.6 g/dL / Pro: 6.2 g/dL / ALK PHOS: 171 U/L / ALT: 112 U/L / AST: 157 U/L / GGT: x           PT/INR - ( 02 Nov 2024 06:40 )   PT: 12.9 sec;   INR: 1.12 ratio         PTT - ( 02 Nov 2024 06:40 )  PTT:33.1 sec  Urinalysis Basic - ( 02 Nov 2024 06:39 )    Color: x / Appearance: x / SG: x / pH: x  Gluc: 108 mg/dL / Ketone: x  / Bili: x / Urobili: x   Blood: x / Protein: x / Nitrite: x   Leuk Esterase: x / RBC: x / WBC x   Sq Epi: x / Non Sq Epi: x / Bacteria: x        PHYSICAL EXAM:  GEN: resting in bed comfortably in NAD  RESP: no increased WOB, no tachypnea, on RA  ABD: soft, non-distended abdomen. Mild tenderness to palpation in epigastrium. No rebound, guarding or rigidity.   Groin: Scrotum edematous but improving, no skin changes or warmth.   EXTR: warm, well-perfused without gross deformities; spontaneous movement in b/l U/L extrem  NEURO: awake, alert      Assessment  38 y M with no significant pmh, presenting with acute onset abdominal pain that woke him up this morning, No associated nausea, vomiting, fevers or chills. Patient reports having 5 drink on the weekend, In the ED, patient was afebrile, VSS, WBC 10. Na 129, T bili 2.4, Alk phos 172, , , lipase 1122, lactate 5.2. CT showed gallbladder sludge, normal CBD, acute interstitial edematous pancreatitis.  Admitted to surgery service for gallstone pancreatitis.     Plan:  - f/u AM labs  - if bili stable, no further hepatology workup     - no plan for steroids as bili improving  - GI consulted, f/u recs  - Diet: Reg, tolerating  - Phenobarb taper completed  - Pain control PRN   - Monitor UOP  - Replete electrolytes PRN   - supportive underwear for scrotal edema     Surgery [id number]

## 2024-11-03 NOTE — PROGRESS NOTE ADULT - SUBJECTIVE AND OBJECTIVE BOX
SUBJECTIVE / OVERNIGHT EVENTS:pt seen and examined  11-3-24    MEDICATIONS  (STANDING):  acetaminophen     Tablet .. 650 milliGRAM(s) Oral every 6 hours  enoxaparin Injectable 40 milliGRAM(s) SubCutaneous every 24 hours  fenofibrate Tablet 145 milliGRAM(s) Oral daily  influenza   Vaccine 0.5 milliLiter(s) IntraMuscular once  omega-3-Acid Ethyl Esters 2 Gram(s) Oral two times a day    MEDICATIONS  (PRN):  lidocaine   4% Patch 1 Patch Transdermal daily PRN pain  oxyCODONE    IR 5 milliGRAM(s) Oral every 6 hours PRN Moderate Pain (4 - 6)  oxyCODONE    IR 10 milliGRAM(s) Oral every 6 hours PRN Severe Pain (7 - 10)    Vital Signs Last 24 Hrs  T(C): 37.7 (24 @ 21:06), Max: 37.7 (24 @ 21:06)  T(F): 99.8 (24 @ 21:06), Max: 99.8 (24 @ 21:06)  HR: 96 (24 @ 22:15) (96 - 104)  BP: 136/93 (24 @ 21:06) (127/84 - 151/99)  BP(mean): --  RR: 18 (24 @ 21:06) (18 - 18)  SpO2: 97% (24 @ 21:06) (96% - 97%)            Constitutional: No fever, fatigue  Skin: No rash.  Eyes: No recent vision problems or eye pain.  ENT: No congestion, ear pain, or sore throat.  Cardiovascular: No chest pain or palpation.  Respiratory: No cough, shortness of breath, congestion, or wheezing.  Gastrointestinal: dec  abdominal pain, nausea, vomiting, or diarrhea.  Genitourinary: No dysuria.  Musculoskeletal: No joint swelling.  Neurologic: No headache.    PHYSICAL EXAM:  GENERAL: NAD  EYES: EOMI, PERRLA  NECK: Supple, No JVD  CHEST/LUNG: dec breath sounds at bases  HEART:  S1 , S2 +  ABDOMEN: soft , mild distension+, tenderness on deep palpation+, no guarding, bs+  EXTREMITIES:  no edema  NEUROLOGY:alert awake oriented     LABS:      131[L]  |  93[L]  |  7   ----------------------------<  105[H]  3.7   |  24  |  0.53    Ca    8.0[L]      2024 07:31  Phos  1.8       Mg     2.0         TPro  6.3  /  Alb  2.7[L]  /  TBili  1.9[H]  /  DBili  1.1[H]  /  AST  99[H]  /  ALT  94[H]  /  AlkPhos  175[H]      Creatinine Trend: 0.53 <--, 0.56 <--, 0.60 <--, 0.72 <--, 0.70 <--, 0.73 <--, 0.77 <--, 0.81 <--, 0.79 <--, 1.04 <--, 1.37 <--, 1.74 <--, 2.46 <--, 2.43 <--, 1.42 <--, 1.31 <--                        10.0   20.91 )-----------( 360      ( 2024 07:35 )             29.5     Urine Studies:  Urinalysis Basic - ( 2024 14:18 )    Color: Dark Yellow / Appearance: Clear / S.014 / pH:   Gluc:  / Ketone: 15 mg/dL  / Bili: Small / Urobili: 1.0 mg/dL   Blood:  / Protein: Negative mg/dL / Nitrite: Negative   Leuk Esterase: Negative / RBC:  / WBC    Sq Epi:  / Non Sq Epi:  / Bacteria:       Osmolality, Random Urine: 343 mos/kg (10-31 @ 06:38)  Sodium, Random Urine: 5 mmol/L (10-31 @ 06:38)  Creatinine, Random Urine: 75 mg/dL (10-31 @ 06:38)  Sodium, Random Urine: 10 mmol/L (10-29 @ 03:39)  Osmolality, Random Urine: 385 mos/kg (10-29 @ 03:39)  Creatinine, Random Urine: 218 mg/dL (10-29 @ 03:39)          LIVER FUNCTIONS - ( 2024 07:31 )  Alb: 2.7 g/dL / Pro: 6.3 g/dL / ALK PHOS: 175 U/L / ALT: 94 U/L / AST: 99 U/L / GGT: x           PT/INR - ( 2024 06:40 )   PT: 12.9 sec;   INR: 1.12 ratio         PTT - ( 2024 06:40 )  PTT:33.1 sec          RADIOLOGY & ADDITIONAL TESTS:    Imaging Personally Reviewed:    Consultant(s) Notes Reviewed:      Care Discussed with Consultants/Other Providers:

## 2024-11-04 LAB
ADD ON TEST-SPECIMEN IN LAB: SIGNIFICANT CHANGE UP
ALBUMIN SERPL ELPH-MCNC: 2.6 G/DL — LOW (ref 3.3–5)
ALP SERPL-CCNC: 148 U/L — HIGH (ref 40–120)
ALT FLD-CCNC: 69 U/L — HIGH (ref 10–45)
ANION GAP SERPL CALC-SCNC: 11 MMOL/L — SIGNIFICANT CHANGE UP (ref 5–17)
AST SERPL-CCNC: 62 U/L — HIGH (ref 10–40)
BILIRUB DIRECT SERPL-MCNC: 0.8 MG/DL — HIGH (ref 0–0.3)
BILIRUB INDIRECT FLD-MCNC: 0.6 MG/DL — SIGNIFICANT CHANGE UP (ref 0.2–1)
BILIRUB SERPL-MCNC: 1.4 MG/DL — HIGH (ref 0.2–1.2)
BUN SERPL-MCNC: 8 MG/DL — SIGNIFICANT CHANGE UP (ref 7–23)
CA-I BLD-SCNC: 1.08 MMOL/L — LOW (ref 1.15–1.33)
CALCIUM SERPL-MCNC: 7.8 MG/DL — LOW (ref 8.4–10.5)
CHLORIDE SERPL-SCNC: 95 MMOL/L — LOW (ref 96–108)
CO2 SERPL-SCNC: 25 MMOL/L — SIGNIFICANT CHANGE UP (ref 22–31)
CREAT SERPL-MCNC: 0.49 MG/DL — LOW (ref 0.5–1.3)
EGFR: 135 ML/MIN/1.73M2 — SIGNIFICANT CHANGE UP
GLUCOSE SERPL-MCNC: 106 MG/DL — HIGH (ref 70–99)
HCT VFR BLD CALC: 24.7 % — LOW (ref 39–50)
HGB BLD-MCNC: 8.5 G/DL — LOW (ref 13–17)
MAGNESIUM SERPL-MCNC: 1.9 MG/DL — SIGNIFICANT CHANGE UP (ref 1.6–2.6)
MCHC RBC-ENTMCNC: 28.1 PG — SIGNIFICANT CHANGE UP (ref 27–34)
MCHC RBC-ENTMCNC: 34.4 G/DL — SIGNIFICANT CHANGE UP (ref 32–36)
MCV RBC AUTO: 81.5 FL — SIGNIFICANT CHANGE UP (ref 80–100)
NRBC # BLD: 0 /100 WBCS — SIGNIFICANT CHANGE UP (ref 0–0)
PHOSPHATE SERPL-MCNC: 2.3 MG/DL — LOW (ref 2.5–4.5)
PLATELET # BLD AUTO: 433 K/UL — HIGH (ref 150–400)
POTASSIUM SERPL-MCNC: 3 MMOL/L — LOW (ref 3.5–5.3)
POTASSIUM SERPL-SCNC: 3 MMOL/L — LOW (ref 3.5–5.3)
PROCALCITONIN SERPL-MCNC: 0.54 NG/ML — HIGH (ref 0.02–0.1)
PROT SERPL-MCNC: 5.8 G/DL — LOW (ref 6–8.3)
RBC # BLD: 3.03 M/UL — LOW (ref 4.2–5.8)
RBC # FLD: 15.9 % — HIGH (ref 10.3–14.5)
SODIUM SERPL-SCNC: 131 MMOL/L — LOW (ref 135–145)
WBC # BLD: 18.84 K/UL — HIGH (ref 3.8–10.5)
WBC # FLD AUTO: 18.84 K/UL — HIGH (ref 3.8–10.5)

## 2024-11-04 PROCEDURE — 99232 SBSQ HOSP IP/OBS MODERATE 35: CPT | Mod: GC

## 2024-11-04 PROCEDURE — 99232 SBSQ HOSP IP/OBS MODERATE 35: CPT

## 2024-11-04 RX ORDER — CALCIUM CARBONATE 400(1000)
1 TABLET,CHEWABLE ORAL
Refills: 0 | Status: DISCONTINUED | OUTPATIENT
Start: 2024-11-04 | End: 2024-11-05

## 2024-11-04 RX ORDER — DIBASIC SODIUM PHOSPHATE, MONOBASIC POTASSIUM PHOSPHATE AND MONOBASIC SODIUM PHOSPHATE 852; 155; 130 MG/1; MG/1; MG/1
2 TABLET ORAL ONCE
Refills: 0 | Status: COMPLETED | OUTPATIENT
Start: 2024-11-04 | End: 2024-11-04

## 2024-11-04 RX ORDER — POTASSIUM CHLORIDE 10 MEQ
40 TABLET, EXTENDED RELEASE ORAL EVERY 4 HOURS
Refills: 0 | Status: COMPLETED | OUTPATIENT
Start: 2024-11-04 | End: 2024-11-04

## 2024-11-04 RX ADMIN — Medication 650 MILLIGRAM(S): at 22:16

## 2024-11-04 RX ADMIN — Medication 650 MILLIGRAM(S): at 10:00

## 2024-11-04 RX ADMIN — Medication 650 MILLIGRAM(S): at 17:24

## 2024-11-04 RX ADMIN — OXYCODONE HYDROCHLORIDE 10 MILLIGRAM(S): 30 TABLET ORAL at 10:19

## 2024-11-04 RX ADMIN — OXYCODONE HYDROCHLORIDE 10 MILLIGRAM(S): 30 TABLET ORAL at 17:24

## 2024-11-04 RX ADMIN — OXYCODONE HYDROCHLORIDE 10 MILLIGRAM(S): 30 TABLET ORAL at 04:14

## 2024-11-04 RX ADMIN — Medication 650 MILLIGRAM(S): at 11:00

## 2024-11-04 RX ADMIN — DIBASIC SODIUM PHOSPHATE, MONOBASIC POTASSIUM PHOSPHATE AND MONOBASIC SODIUM PHOSPHATE 2 PACKET(S): 852; 155; 130 TABLET ORAL at 10:21

## 2024-11-04 RX ADMIN — Medication 40 MILLIGRAM(S): at 05:03

## 2024-11-04 RX ADMIN — Medication 2 GRAM(S): at 17:31

## 2024-11-04 RX ADMIN — Medication 40 MILLIEQUIVALENT(S): at 13:22

## 2024-11-04 RX ADMIN — OXYCODONE HYDROCHLORIDE 10 MILLIGRAM(S): 30 TABLET ORAL at 23:25

## 2024-11-04 RX ADMIN — OXYCODONE HYDROCHLORIDE 10 MILLIGRAM(S): 30 TABLET ORAL at 11:18

## 2024-11-04 RX ADMIN — Medication 40 MILLIEQUIVALENT(S): at 10:21

## 2024-11-04 RX ADMIN — Medication 650 MILLIGRAM(S): at 21:11

## 2024-11-04 RX ADMIN — Medication 2 GRAM(S): at 05:03

## 2024-11-04 RX ADMIN — Medication 650 MILLIGRAM(S): at 16:12

## 2024-11-04 RX ADMIN — OXYCODONE HYDROCHLORIDE 10 MILLIGRAM(S): 30 TABLET ORAL at 16:24

## 2024-11-04 RX ADMIN — OXYCODONE HYDROCHLORIDE 10 MILLIGRAM(S): 30 TABLET ORAL at 05:14

## 2024-11-04 RX ADMIN — Medication 650 MILLIGRAM(S): at 04:01

## 2024-11-04 RX ADMIN — FENOFIBRATE 145 MILLIGRAM(S): 145 TABLET, FILM COATED ORAL at 13:22

## 2024-11-04 RX ADMIN — Medication 1 TABLET(S): at 17:30

## 2024-11-04 RX ADMIN — Medication 650 MILLIGRAM(S): at 05:14

## 2024-11-04 NOTE — PROGRESS NOTE ADULT - PROBLEM SELECTOR PLAN 4
5 drinks every other day and willing to cut down  no hx of etoh withdrawal per pt  Hepatic steatosis likely due to etoh use. cessation advised  - SW consult  - advise limiting etoh use

## 2024-11-04 NOTE — PROGRESS NOTE ADULT - SUBJECTIVE AND OBJECTIVE BOX
SUBJECTIVE / OVERNIGHT EVENTS:pt seen and examined  11-4-24    MEDICATIONS  (STANDING):  acetaminophen     Tablet .. 650 milliGRAM(s) Oral every 6 hours  calcium carbonate    500 mG (Tums) Chewable 1 Tablet(s) Chew two times a day  enoxaparin Injectable 40 milliGRAM(s) SubCutaneous every 24 hours  fenofibrate Tablet 145 milliGRAM(s) Oral daily  influenza   Vaccine 0.5 milliLiter(s) IntraMuscular once  omega-3-Acid Ethyl Esters 2 Gram(s) Oral two times a day    MEDICATIONS  (PRN):  lidocaine   4% Patch 1 Patch Transdermal daily PRN pain  oxyCODONE    IR 10 milliGRAM(s) Oral every 6 hours PRN Severe Pain (7 - 10)  oxyCODONE    IR 5 milliGRAM(s) Oral every 6 hours PRN Moderate Pain (4 - 6)    Vital Signs Last 24 Hrs  T(C): 36.9 (11-04-24 @ 16:11), Max: 37.7 (11-03-24 @ 21:06)  T(F): 98.5 (11-04-24 @ 16:11), Max: 99.8 (11-03-24 @ 21:06)  HR: 101 (11-04-24 @ 16:11) (96 - 107)  BP: 147/89 (11-04-24 @ 16:11) (134/84 - 147/89)  BP(mean): --  RR: 18 (11-04-24 @ 16:11) (18 - 18)  SpO2: 98% (11-04-24 @ 16:11) (96% - 98%)        Constitutional: No fever, fatigue  Skin: No rash.  Eyes: No recent vision problems or eye pain.  ENT: No congestion, ear pain, or sore throat.  Cardiovascular: No chest pain or palpation.  Respiratory: No cough, shortness of breath, congestion, or wheezing.  Gastrointestinal: dec  abdominal pain, nausea, vomiting, or diarrhea.  Genitourinary: No dysuria.  Musculoskeletal: No joint swelling.  Neurologic: No headache.    PHYSICAL EXAM:  GENERAL: NAD  EYES: EOMI, PERRLA  NECK: Supple, No JVD  CHEST/LUNG: dec breath sounds at bases  HEART:  S1 , S2 +  ABDOMEN: soft , mild distension+, tenderness on deep palpation+, no guarding, bs+  EXTREMITIES:  no edema  NEUROLOGY:alert awake oriented       LABS:  11-04    131[L]  |  95[L]  |  8   ----------------------------<  106[H]  3.0[L]   |  25  |  0.49[L]    Ca    7.8[L]      04 Nov 2024 07:17  Phos  2.3     11-04  Mg     1.9     11-04    TPro  5.8[L]  /  Alb  2.6[L]  /  TBili  1.4[H]  /  DBili  0.8[H]  /  AST  62[H]  /  ALT  69[H]  /  AlkPhos  148[H]  11-04    Creatinine Trend: 0.49 <--, 0.53 <--, 0.56 <--, 0.60 <--, 0.72 <--, 0.70 <--, 0.73 <--, 0.77 <--, 0.81 <--, 0.79 <--, 1.04 <--, 1.37 <--, 1.74 <--, 2.46 <--, 2.43 <--                        8.5    18.84 )-----------( 433      ( 04 Nov 2024 07:17 )             24.7     Urine Studies:  Urinalysis Basic - ( 04 Nov 2024 07:17 )    Color:  / Appearance:  / SG:  / pH:   Gluc: 106 mg/dL / Ketone:   / Bili:  / Urobili:    Blood:  / Protein:  / Nitrite:    Leuk Esterase:  / RBC:  / WBC    Sq Epi:  / Non Sq Epi:  / Bacteria:       Osmolality, Random Urine: 343 mos/kg (10-31 @ 06:38)  Sodium, Random Urine: 5 mmol/L (10-31 @ 06:38)  Creatinine, Random Urine: 75 mg/dL (10-31 @ 06:38)  Sodium, Random Urine: 10 mmol/L (10-29 @ 03:39)  Osmolality, Random Urine: 385 mos/kg (10-29 @ 03:39)  Creatinine, Random Urine: 218 mg/dL (10-29 @ 03:39)          LIVER FUNCTIONS - ( 04 Nov 2024 07:17 )  Alb: 2.6 g/dL / Pro: 5.8 g/dL / ALK PHOS: 148 U/L / ALT: 69 U/L / AST: 62 U/L / GGT: x             RADIOLOGY & ADDITIONAL TESTS:    Imaging Personally Reviewed:    Consultant(s) Notes Reviewed:      Care Discussed with Consultants/Other Providers:

## 2024-11-04 NOTE — PROVIDER CONTACT NOTE (OTHER) - REASON
pt stated to RN "I think I have a hernia in my testicles"
pt tachycardic
High Blood Pressure
pt ordered for 500Cc bolus

## 2024-11-04 NOTE — PROGRESS NOTE ADULT - SUBJECTIVE AND OBJECTIVE BOX
Interval Events:   No acute events overnight.    Patient denied fever, chills, nausea, vomiting, or GIB. Still having testicular pain, but no abdominal pain. Tolerating PO.     Hospital Medications:  acetaminophen     Tablet .. 650 milliGRAM(s) Oral every 6 hours  calcium carbonate    500 mG (Tums) Chewable 1 Tablet(s) Chew two times a day  enoxaparin Injectable 40 milliGRAM(s) SubCutaneous every 24 hours  fenofibrate Tablet 145 milliGRAM(s) Oral daily  influenza   Vaccine 0.5 milliLiter(s) IntraMuscular once  lidocaine   4% Patch 1 Patch Transdermal daily PRN  omega-3-Acid Ethyl Esters 2 Gram(s) Oral two times a day  oxyCODONE    IR 5 milliGRAM(s) Oral every 6 hours PRN  oxyCODONE    IR 10 milliGRAM(s) Oral every 6 hours PRN      PHYSICAL EXAM:   Vital Signs:  Vital Signs Last 24 Hrs  T(C): 36.9 (04 Nov 2024 16:11), Max: 37.7 (03 Nov 2024 21:06)  T(F): 98.5 (04 Nov 2024 16:11), Max: 99.8 (03 Nov 2024 21:06)  HR: 101 (04 Nov 2024 16:11) (96 - 107)  BP: 147/89 (04 Nov 2024 16:11) (134/84 - 147/89)  BP(mean): --  RR: 18 (04 Nov 2024 16:11) (18 - 18)  SpO2: 98% (04 Nov 2024 16:11) (96% - 98%)    Parameters below as of 04 Nov 2024 16:11  Patient On (Oxygen Delivery Method): room air      Daily     Daily     GENERAL: no acute distress  NEURO: alert and oriented x 3, no asterixis  HEENT: NCAT, anicteric sclera  CHEST: no respiratory distress, no accessory muscle use  CARDIAC: regular rate, +S1/S2  ABDOMEN: soft, nontender, no rebound or guarding  EXTREMITIES: warm, well perfused  SKIN: no active lesions noted    LABS: reviewed                        8.5    18.84 )-----------( 433      ( 04 Nov 2024 07:17 )             24.7     11-04    131[L]  |  95[L]  |  8   ----------------------------<  106[H]  3.0[L]   |  25  |  0.49[L]    Ca    7.8[L]      04 Nov 2024 07:17  Phos  2.3     11-04  Mg     1.9     11-04    TPro  5.8[L]  /  Alb  2.6[L]  /  TBili  1.4[H]  /  DBili  0.8[H]  /  AST  62[H]  /  ALT  69[H]  /  AlkPhos  148[H]  11-04

## 2024-11-04 NOTE — PROGRESS NOTE ADULT - PROBLEM SELECTOR PROBLEM 2
Hypertriglyceridemia
JENNY (acute kidney injury)

## 2024-11-04 NOTE — PROVIDER CONTACT NOTE (OTHER) - BACKGROUND
pt admitted with pancreatitis
Pt admitted for acute pancreatitis.
pt admitted with pancreatitis
Pt admitted for acute pancreatitis.

## 2024-11-04 NOTE — PROVIDER CONTACT NOTE (OTHER) - ASSESSMENT
Pt is A&Ox4, VS as charted. Pt sitting low 100s HR due to groin/scrotal pain. After pain is relieved, pt HR drops. Scrotal strap in place, helps pt with slight relief. Last EKG 10/28, confirmed sinus tachycardia.
RN went into pts room with hang bolus and pt stated "I feel so bloated and distended". on examination pts abd is more distended than before.
Pt A&OX4.  All VS as charted.  pt not complaining of any chest pain, SOB or discomfort.
pt stated to RN "I think I have a hernia in my testicles". during examination pts testicles are larger. pt stated "I don't know if it its a hernia from coughing so much or if its fluid build up".

## 2024-11-04 NOTE — PROGRESS NOTE ADULT - PROBLEM SELECTOR PROBLEM 5
Lactic acidosis
Prophylactic measure

## 2024-11-04 NOTE — PROGRESS NOTE ADULT - ASSESSMENT
38 y M with no significant pmh, presenting with acute onset abdominal pain. Admitted to surgery service for concern of gallstone pancreatitis. MRCP was negative for biliary obstruction.    Hepatology was consulted for liver enzyme elevation.     Assessment:  # Alcohol associated hepatitis  # hepatic steatosis    # acute pancreatitis   # anasarca   - Lyney discriminate function score can only be calculated from 10/28 however is 7.3, indicating good prognosis and no need for glucocorticoid therapy  - acute pancreatitis likely 2/2 alcohol misuse, pt is receiving aggressive fluid resuscitation   - anasarca may be 2/2 pancreatitis however may also be 2/2 liver dysfunction  - Bili downtrending    RECOMMENDATION  - No indication for steroids therapy at this time. Patient may follow up with PCP for repeat CMP. If liver enzymes and bili worsen, may follow up with hepatology outpatient  - Discussed with patient, may initiate acamprosate 333 mg TID for ETOH urge suppression.   - Emphasized on importance of ETOH cessation  - Rest of care per primary team  - Hepatology team will sign off. Please reconsult as needed    Seen and d/w Dr. Hadley Ellis  GI/Hepatology Fellow    MONDAY-FRIDAY 8AM-5PM:  Pager# [phone] ([facility]) or [phone] ([facility])    NON-URGENT CONSULTS:  Please email [email or web address]  AT NIGHT AND ON WEEKENDS:  Contact on-call GI fellow via AMION by paging or hospital  from 5pm-8am and on weekends/holidays

## 2024-11-04 NOTE — PROGRESS NOTE ADULT - PROBLEM SELECTOR PLAN 6
appreciate endo input  Lipase 1122, CT with acute pancreatitis, TG 1649 on presentation, lactic acidosis  Today, corrected Ca 8.0, mag 2.6, creatinine normalized  TG trend: 1649 (10/27/24 21:46) -> 572 (10/29/24 15:36) -> 741 (10/30/24 04:40)    -fenofibrate and Lovaza as per endo

## 2024-11-04 NOTE — PROGRESS NOTE ADULT - SUBJECTIVE AND OBJECTIVE BOX
SURGERY DAILY PROGRESS NOTE    24 Hour/Overnight Events: No acute events overnight    SUBJECTIVE: Patient feeling well today. Denies abd pain and is tolerating his diet without increased pain. He notes some scrotal swelling though this is unchanged. He is having bowel movements and passing gas    ------------------------------------------------------------------------------------------------------------  OBJECTIVE:  Vital Signs Last 24 Hrs  T(C): 37 (04 Nov 2024 08:50), Max: 37.7 (03 Nov 2024 21:06)  T(F): 98.6 (04 Nov 2024 08:50), Max: 99.8 (03 Nov 2024 21:06)  HR: 107 (04 Nov 2024 08:50) (96 - 107)  BP: 137/88 (04 Nov 2024 08:50) (127/84 - 143/96)  BP(mean): --  RR: 18 (04 Nov 2024 08:50) (18 - 18)  SpO2: 98% (04 Nov 2024 08:50) (96% - 98%)    Parameters below as of 04 Nov 2024 08:50  Patient On (Oxygen Delivery Method): room air      I&O's Detail    03 Nov 2024 07:01  -  04 Nov 2024 07:00  --------------------------------------------------------  IN:    Oral Fluid: 580 mL  Total IN: 580 mL    OUT:    Voided (mL): 700 mL  Total OUT: 700 mL    Total NET: -120 mL          LABS:                        8.5    18.84 )-----------( 433      ( 04 Nov 2024 07:17 )             24.7     11-04    131[L]  |  95[L]  |  8   ----------------------------<  106[H]  3.0[L]   |  25  |  0.49[L]    Ca    7.8[L]      04 Nov 2024 07:17  Phos  2.3     11-04  Mg     1.9     11-04    TPro  5.8[L]  /  Alb  2.6[L]  /  TBili  1.4[H]  /  DBili  0.8[H]  /  AST  62[H]  /  ALT  69[H]  /  AlkPhos  148[H]  11-04    LIVER FUNCTIONS - ( 04 Nov 2024 07:17 )  Alb: 2.6 g/dL / Pro: 5.8 g/dL / ALK PHOS: 148 U/L / ALT: 69 U/L / AST: 62 U/L / GGT: x             Urinalysis Basic - ( 04 Nov 2024 07:17 )    Color: x / Appearance: x / SG: x / pH: x  Gluc: 106 mg/dL / Ketone: x  / Bili: x / Urobili: x   Blood: x / Protein: x / Nitrite: x   Leuk Esterase: x / RBC: x / WBC x   Sq Epi: x / Non Sq Epi: x / Bacteria: x        PHYSICAL EXAM:  GEN: resting in bed comfortably in NAD  RESP: no increased WOB, no tachypnea, on RA  ABD: soft, non-distended abdomen. Mild tenderness to palpation in epigastrium. No rebound, guarding or rigidity.   Groin: Scrotum edematous but improving, no skin changes or warmth.   EXTR: warm, well-perfused without gross deformities; spontaneous movement in b/l U/L extrem  NEURO: awake, alert      Assessment  38 y M with no significant pmh, presenting with acute onset abdominal pain that woke him up this morning, No associated nausea, vomiting, fevers or chills. Patient reports having 5 drink on the weekend, In the ED, patient was afebrile, VSS, WBC 10. Na 129, T bili 2.4, Alk phos 172, , , lipase 1122, lactate 5.2. CT showed gallbladder sludge, normal CBD, acute interstitial edematous pancreatitis.  Admitted to surgery service for gallstone pancreatitis.     Plan:  - f/u AM labs - replete as needed  - if bili stable, no further hepatology workup     - no plan for steroids as bili improving  - GI consulted, f/u recs  - Diet: Reg, tolerating  - Phenobarb taper completed  - Pain control PRN   - Monitor UOP  - Replete electrolytes PRN   - supportive underwear for scrotal edema     Surgery [id number]  SURGERY DAILY PROGRESS NOTE    24 Hour/Overnight Events: No acute events overnight    SUBJECTIVE: Patient feeling well today. Denies abd pain and is tolerating his diet without increased pain. He notes some scrotal swelling though this is unchanged. He is having bowel movements and passing gas    ------------------------------------------------------------------------------------------------------------  OBJECTIVE:  Vital Signs Last 24 Hrs  T(C): 37 (04 Nov 2024 08:50), Max: 37.7 (03 Nov 2024 21:06)  T(F): 98.6 (04 Nov 2024 08:50), Max: 99.8 (03 Nov 2024 21:06)  HR: 107 (04 Nov 2024 08:50) (96 - 107)  BP: 137/88 (04 Nov 2024 08:50) (127/84 - 143/96)  BP(mean): --  RR: 18 (04 Nov 2024 08:50) (18 - 18)  SpO2: 98% (04 Nov 2024 08:50) (96% - 98%)    Parameters below as of 04 Nov 2024 08:50  Patient On (Oxygen Delivery Method): room air      I&O's Detail    03 Nov 2024 07:01  -  04 Nov 2024 07:00  --------------------------------------------------------  IN:    Oral Fluid: 580 mL  Total IN: 580 mL    OUT:    Voided (mL): 700 mL  Total OUT: 700 mL    Total NET: -120 mL          LABS:                        8.5    18.84 )-----------( 433      ( 04 Nov 2024 07:17 )             24.7     11-04    131[L]  |  95[L]  |  8   ----------------------------<  106[H]  3.0[L]   |  25  |  0.49[L]    Ca    7.8[L]      04 Nov 2024 07:17  Phos  2.3     11-04  Mg     1.9     11-04    TPro  5.8[L]  /  Alb  2.6[L]  /  TBili  1.4[H]  /  DBili  0.8[H]  /  AST  62[H]  /  ALT  69[H]  /  AlkPhos  148[H]  11-04    LIVER FUNCTIONS - ( 04 Nov 2024 07:17 )  Alb: 2.6 g/dL / Pro: 5.8 g/dL / ALK PHOS: 148 U/L / ALT: 69 U/L / AST: 62 U/L / GGT: x             Urinalysis Basic - ( 04 Nov 2024 07:17 )    Color: x / Appearance: x / SG: x / pH: x  Gluc: 106 mg/dL / Ketone: x  / Bili: x / Urobili: x   Blood: x / Protein: x / Nitrite: x   Leuk Esterase: x / RBC: x / WBC x   Sq Epi: x / Non Sq Epi: x / Bacteria: x        PHYSICAL EXAM:  GEN: resting in bed comfortably in NAD  RESP: no increased WOB, no tachypnea, on RA  ABD: soft, non-distended abdomen. Mild tenderness to palpation in epigastrium. No rebound, guarding or rigidity.   Groin: Scrotum edematous but improving, no skin changes or warmth.   EXTR: warm, well-perfused without gross deformities; spontaneous movement in b/l U/L extrem  NEURO: awake, alert      Assessment  38 y M with no significant pmh, presenting with acute onset abdominal pain that woke him up this morning, No associated nausea, vomiting, fevers or chills. Patient reports having 5 drink on the weekend, In the ED, patient was afebrile, VSS, WBC 10. Na 129, T bili 2.4, Alk phos 172, , , lipase 1122, lactate 5.2. CT showed gallbladder sludge, normal CBD, acute interstitial edematous pancreatitis.  Admitted to surgery service for gallstone pancreatitis.     Plan:  - f/u AM labs - replete lytes as needed  - bili decreasing so no further hepatology workup     - no plan for steroids as bili improving  - Diet: Reg, tolerating  - Phenobarb taper completed  - Pain control PRN   - Monitor UOP  - supportive underwear for scrotal edema   - dispo: planning for today vs tomorrow    Surgery [id number]

## 2024-11-04 NOTE — PROGRESS NOTE ADULT - PROBLEM SELECTOR PROBLEM 6
Mixed hypertriglyceridemia

## 2024-11-04 NOTE — PROGRESS NOTE ADULT - PROBLEM SELECTOR PLAN 1
ddx gallstone related vs hyperTG (noted triglycerides in 1000s) vs alcohol induced  -  - trend LFTS  - symptomatic support - IVF, pain control prn, advance diet as per surgery with close monitor of pts symptoms  - < from: MR MRCP w/wo IV Cont (10.31.24 @ 19:06) >    Pancreatitis with small area of necrosis in the tail. No pseudocyst or   abscess identified.  Pleural effusion, mild ascites, left retroperitoneum fluid, and anasarca   noted.    < end of copied text >    gi/ surgery f/u  worsening leukocytosis - infectious vs reactive  pt afebrile
ddx gallstone related vs hyperTG (noted triglycerides in 1000s) vs alcohol induced  - as per surgery, likely inpt cholecystectomy prior to DC  - trend LFTS  - symptomatic support - IVF, pain control prn, clears and adv as tolerates  - repeat TG level with PCP - pt reports hx of elevated numbers outpt  - trend TGs, consult endocrine for mgmt  - monitor lytes and replete - hypomg was repleted but continue to moniter  - trend lactate - should improved w/ IVF  - low fat diet
ddx gallstone related vs hyperTG (noted triglycerides in 1000s) vs alcohol induced  - as per surgery, likely inpt cholecystectomy prior to DC  - trend LFTS  - symptomatic support - IVF, pain control prn, advance diet as per surgery with close monitor of pts symptoms
ddx gallstone related vs hyperTG (noted triglycerides in 1000s) vs alcohol induced  -  - trend LFTS  - symptomatic support - IVF, pain control prn, advance diet as per surgery with close monitor of pts symptoms  - < from: MR MRCP w/wo IV Cont (10.31.24 @ 19:06) >    Pancreatitis with small area of necrosis in the tail. No pseudocyst or   abscess identified.  Pleural effusion, mild ascites, left retroperitoneum fluid, and anasarca   noted.    < end of copied text >    gi/ surgery f/u  worsening leukocytosis - infectious vs reactive  pt afebrile
ddx gallstone related vs hyperTG (noted triglycerides in 1000s) vs alcohol induced  -  - trend LFTS  - symptomatic support - IVF, pain control prn, advance diet as per surgery with close monitor of pts symptoms  - < from: MR MRCP w/wo IV Cont (10.31.24 @ 19:06) >    Pancreatitis with small area of necrosis in the tail. No pseudocyst or   abscess identified.  Pleural effusion, mild ascites, left retroperitoneum fluid, and anasarca   noted.    < end of copied text >    gi/ surgery f/u
ddx gallstone related vs hyperTG (noted triglycerides in 1000s) vs alcohol induced  -  - trend LFTS  - symptomatic support - IVF, pain control prn, advance diet as per surgery with close monitor of pts symptoms  - < from: MR MRCP w/wo IV Cont (10.31.24 @ 19:06) >    Pancreatitis with small area of necrosis in the tail. No pseudocyst or   abscess identified.  Pleural effusion, mild ascites, left retroperitoneum fluid, and anasarca   noted.    < end of copied text >    gi/ surgery f/u
ddx gallstone related vs hyperTG (noted triglycerides in 1000s) vs alcohol induced  - as per surgery, likely inpt cholecystectomy prior to DC  - trend LFTS  - symptomatic support - IVF, pain control prn, advance diet as per surgery with close monitor of pts symptoms

## 2024-11-04 NOTE — PROGRESS NOTE ADULT - PROBLEM SELECTOR PROBLEM 3
Hyponatremia
JENNY (acute kidney injury)
Hyponatremia
JENNY (acute kidney injury)
JENNY (acute kidney injury)
Hyponatremia

## 2024-11-04 NOTE — PROGRESS NOTE ADULT - PROBLEM SELECTOR PROBLEM 1
Acute pancreatitis

## 2024-11-04 NOTE — PROGRESS NOTE ADULT - PROBLEM SELECTOR PLAN 5
dvt ppx: lovenox  no PT needs  Dispo: pending symptom improvement  Would repeat HCG prior to dc - if remains elevated will need outpt follow up   PCP Dr Charley Zamora
dvt ppx: lovenox

## 2024-11-04 NOTE — PROGRESS NOTE ADULT - ASSESSMENT
Patient is a 38 year old male with no significant PMH who presented to the hospital with pancreatitis and hypertriglyceridemia.   Endocrinology consulted for hypertriglyceridemia.    #Hypertriglyceridemia-induced pancreatitis with worrisome features, resolved  #Hypocalcemia, resolved  #JENNY, resolved  #Lactic acidosis, resolved  Initially, lipase 1122, CT with acute pancreatitis, TG 1649 on presentation, lactic acidosis  Today, corrected Ca 8.9  TG trend: 1649 (10/27/24 21:46) -> 572 (10/29/24 15:36) -> 741 (10/30/24 04:40) -> 317 (10/31/24 06:45)  PLAN:  - Continue medical treatment for hypertriglyceridemia: fenofibrate 145 mg daily, Lovaza 2 g BID. These medications should be continued upon discharge.   - Low fat, low sugar diet  - Pancreatitis treatment per primary team  - Patient will need follow up with [facility] lipid clinic, Dr. Angel Knox [phone] [address], [address], [place] [address]  - Extensive counseling previously provided to patient regarding alcohol cessation including risk of recurrent pancreatitis.     Pending discussion with attending physician.  INCOMPLETE NOTE  Patient is a 38 year old male with no significant PMH who presented to the hospital with pancreatitis and hypertriglyceridemia.   Endocrinology consulted for hypertriglyceridemia.    #Hypertriglyceridemia-induced pancreatitis with worrisome features, resolved  #Hypocalcemia, resolved  #JENNY, resolved  #Lactic acidosis, resolved  Initially, lipase 1122, CT with acute pancreatitis, TG 1649 on presentation, lactic acidosis  Today, corrected Ca 8.9  TG trend: 1649 (10/27/24 21:46) -> 572 (10/29/24 15:36) -> 741 (10/30/24 04:40) -> 317 (10/31/24 06:45)  PLAN:  - Continue medical treatment for hypertriglyceridemia: fenofibrate 145 mg daily, Lovaza 2 g BID. These medications should be continued upon discharge.   - Low fat, low sugar diet  - Pancreatitis treatment per primary team  - Patient will need follow up with [facility] lipid clinic, Dr. Angel Knox [phone] [address] [place] [address], [address], [place] [address]  - Extensive counseling previously provided to patient regarding alcohol cessation including risk of recurrent pancreatitis.     Endocrinology to sign off at this time. Please reconsult if needed. Thank you for the courtesy of this consult.     Discussed with attending physician.  Trevor Robles DO   PGY-4 Endocrine Fellow  Can be reached via [employer] Teams.    For follow up questions, discharge recommendations or new consults, please email [email or web address] ([email or web address] or [email or web address] ([email or web address]) or call the answering service at [phone] (weekdays); [phone] (nights/weekends).   For emergencies, please page fellow on call.

## 2024-11-04 NOTE — PROGRESS NOTE ADULT - PROBLEM SELECTOR PROBLEM 4
Moderate alcohol use disorder
Hypocalcemia
Moderate alcohol use disorder

## 2024-11-04 NOTE — PROVIDER CONTACT NOTE (OTHER) - ACTION/TREATMENT ORDERED:
provider came to bedside to assess pt. provider stated to hold bolus.
provider at bedside assessing pt. provider stated its fluid build up. plan of care continues.
Await for when pain meds are available, will continue to follow care plan.
No further actions needed at this time.

## 2024-11-04 NOTE — PROGRESS NOTE ADULT - SUBJECTIVE AND OBJECTIVE BOX
ENDOCRINE FOLLOW UP     Chief Complaint: abdominal pain  Endocrine consulted for hypertriglyceridemia-induced pancreatitis  History: Patient seen and examined on rounds. Reports that he is feeling significantly better today. He has some scrotal swelling causing him discomfort, especially with both bowel movements and urination. He reports resolution of his abdominal pain. He has some back pain that improves with changing positions in bed.   He states that he will be more careful about diet and alcohol intake moving forward. He is motivated to get better by his two children.     MEDICATIONS  (STANDING):  acetaminophen     Tablet .. 650 milliGRAM(s) Oral every 6 hours  calcium carbonate    500 mG (Tums) Chewable 1 Tablet(s) Chew two times a day  enoxaparin Injectable 40 milliGRAM(s) SubCutaneous every 24 hours  fenofibrate Tablet 145 milliGRAM(s) Oral daily  influenza   Vaccine 0.5 milliLiter(s) IntraMuscular once  omega-3-Acid Ethyl Esters 2 Gram(s) Oral two times a day  potassium chloride    Tablet ER 40 milliEquivalent(s) Oral every 4 hours    MEDICATIONS  (PRN):  lidocaine   4% Patch 1 Patch Transdermal daily PRN pain  oxyCODONE    IR 5 milliGRAM(s) Oral every 6 hours PRN Moderate Pain (4 - 6)  oxyCODONE    IR 10 milliGRAM(s) Oral every 6 hours PRN Severe Pain (7 - 10)      Allergies    No Known Allergies    Intolerances        ROS: All other systems reviewed and negative    PHYSICAL EXAM:  VITALS: T(C): 37 (11-04-24 @ 08:50)  T(F): 98.6 (11-04-24 @ 08:50), Max: 99.8 (11-03-24 @ 21:06)  HR: 107 (11-04-24 @ 08:50) (96 - 107)  BP: 137/88 (11-04-24 @ 08:50) (127/84 - 143/96)  RR:  (18 - 18)  SpO2:  (96% - 98%)  Wt(kg): 77.1 kg  GENERAL: NAD, resting comfortably   EYES: No proptosis,  anicteric  HEENT:  Atraumatic, Normocephalic, moist mucous membranes  RESPIRATORY: Nonlabored respirations on room air, normal rate/effort   CARDIOVASCULAR: Regular rate and rhythm; no heave  GI: Soft, nontender, non distended  NEURO: Alert and oriented, moves all extremities spontaneously  PSYCH:  reactive affect, euthymic mood        11-04    131[L]  |  95[L]  |  8   ----------------------------<  106[H]  3.0[L]   |  25  |  0.49[L]    eGFR: 135    Ca    7.8[L]      11-04  Mg     1.9     11-04  Phos  2.3     11-04    TPro  5.8[L]  /  Alb  2.6[L]  /  TBili  1.4[H]  /  DBili  0.8[H]  /  AST  62[H]  /  ALT  69[H]  /  AlkPhos  148[H]  11-04      A1C with Estimated Average Glucose Result: 5.5 % (10-29-24 @ 07:03)      Thyroid Stimulating Hormone, Serum: 3.48 uIU/mL (10-29-24 @ 07:05)

## 2024-11-05 ENCOUNTER — TRANSCRIPTION ENCOUNTER (OUTPATIENT)
Age: 39
End: 2024-11-05

## 2024-11-05 VITALS
RESPIRATION RATE: 18 BRPM | HEART RATE: 95 BPM | DIASTOLIC BLOOD PRESSURE: 89 MMHG | SYSTOLIC BLOOD PRESSURE: 125 MMHG | OXYGEN SATURATION: 98 % | TEMPERATURE: 98 F

## 2024-11-05 LAB
ALBUMIN SERPL ELPH-MCNC: 2.6 G/DL — LOW (ref 3.3–5)
ALP SERPL-CCNC: 144 U/L — HIGH (ref 40–120)
ALT FLD-CCNC: 64 U/L — HIGH (ref 10–45)
ANION GAP SERPL CALC-SCNC: 17 MMOL/L — SIGNIFICANT CHANGE UP (ref 5–17)
AST SERPL-CCNC: 57 U/L — HIGH (ref 10–40)
BILIRUB SERPL-MCNC: 1.2 MG/DL — SIGNIFICANT CHANGE UP (ref 0.2–1.2)
BUN SERPL-MCNC: 8 MG/DL — SIGNIFICANT CHANGE UP (ref 7–23)
CALCIUM SERPL-MCNC: 7.7 MG/DL — LOW (ref 8.4–10.5)
CHLORIDE SERPL-SCNC: 94 MMOL/L — LOW (ref 96–108)
CO2 SERPL-SCNC: 22 MMOL/L — SIGNIFICANT CHANGE UP (ref 22–31)
CREAT SERPL-MCNC: 0.52 MG/DL — SIGNIFICANT CHANGE UP (ref 0.5–1.3)
EGFR: 132 ML/MIN/1.73M2 — SIGNIFICANT CHANGE UP
GLUCOSE SERPL-MCNC: 87 MG/DL — SIGNIFICANT CHANGE UP (ref 70–99)
HCT VFR BLD CALC: 26.2 % — LOW (ref 39–50)
HGB BLD-MCNC: 8.7 G/DL — LOW (ref 13–17)
MAGNESIUM SERPL-MCNC: 2 MG/DL — SIGNIFICANT CHANGE UP (ref 1.6–2.6)
MCHC RBC-ENTMCNC: 27.6 PG — SIGNIFICANT CHANGE UP (ref 27–34)
MCHC RBC-ENTMCNC: 33.2 G/DL — SIGNIFICANT CHANGE UP (ref 32–36)
MCV RBC AUTO: 83.2 FL — SIGNIFICANT CHANGE UP (ref 80–100)
NRBC # BLD: 0 /100 WBCS — SIGNIFICANT CHANGE UP (ref 0–0)
PHOSPHATE SERPL-MCNC: 2 MG/DL — LOW (ref 2.5–4.5)
PLATELET # BLD AUTO: 543 K/UL — HIGH (ref 150–400)
POTASSIUM SERPL-MCNC: 3.5 MMOL/L — SIGNIFICANT CHANGE UP (ref 3.5–5.3)
POTASSIUM SERPL-SCNC: 3.5 MMOL/L — SIGNIFICANT CHANGE UP (ref 3.5–5.3)
PROT SERPL-MCNC: 6 G/DL — SIGNIFICANT CHANGE UP (ref 6–8.3)
RBC # BLD: 3.15 M/UL — LOW (ref 4.2–5.8)
RBC # FLD: 15.8 % — HIGH (ref 10.3–14.5)
SODIUM SERPL-SCNC: 133 MMOL/L — LOW (ref 135–145)
WBC # BLD: 19.56 K/UL — HIGH (ref 3.8–10.5)
WBC # FLD AUTO: 19.56 K/UL — HIGH (ref 3.8–10.5)

## 2024-11-05 PROCEDURE — 99238 HOSP IP/OBS DSCHRG MGMT 30/<: CPT

## 2024-11-05 RX ORDER — OXYCODONE HYDROCHLORIDE 30 MG/1
1 TABLET ORAL
Qty: 5 | Refills: 0
Start: 2024-11-05

## 2024-11-05 RX ORDER — OXYCODONE HYDROCHLORIDE 30 MG/1
10 TABLET ORAL EVERY 6 HOURS
Refills: 0 | Status: DISCONTINUED | OUTPATIENT
Start: 2024-11-05 | End: 2024-11-05

## 2024-11-05 RX ORDER — SODIUM PHOSPHATE, MONOBASIC, MONOHYDRATE AND SODIUM PHOSPHATE, DIBASIC ANHYDROUS 276; 142 MG/ML; MG/ML
15 INJECTION, SOLUTION INTRAVENOUS ONCE
Refills: 0 | Status: COMPLETED | OUTPATIENT
Start: 2024-11-05 | End: 2024-11-05

## 2024-11-05 RX ORDER — OXYCODONE HYDROCHLORIDE 30 MG/1
5 TABLET ORAL EVERY 6 HOURS
Refills: 0 | Status: DISCONTINUED | OUTPATIENT
Start: 2024-11-05 | End: 2024-11-05

## 2024-11-05 RX ADMIN — Medication 650 MILLIGRAM(S): at 04:00

## 2024-11-05 RX ADMIN — OXYCODONE HYDROCHLORIDE 10 MILLIGRAM(S): 30 TABLET ORAL at 13:45

## 2024-11-05 RX ADMIN — FENOFIBRATE 145 MILLIGRAM(S): 145 TABLET, FILM COATED ORAL at 13:00

## 2024-11-05 RX ADMIN — Medication 2 GRAM(S): at 05:17

## 2024-11-05 RX ADMIN — OXYCODONE HYDROCHLORIDE 10 MILLIGRAM(S): 30 TABLET ORAL at 07:01

## 2024-11-05 RX ADMIN — OXYCODONE HYDROCHLORIDE 10 MILLIGRAM(S): 30 TABLET ORAL at 00:25

## 2024-11-05 RX ADMIN — Medication 650 MILLIGRAM(S): at 13:00

## 2024-11-05 RX ADMIN — Medication 650 MILLIGRAM(S): at 03:10

## 2024-11-05 RX ADMIN — OXYCODONE HYDROCHLORIDE 10 MILLIGRAM(S): 30 TABLET ORAL at 06:29

## 2024-11-05 RX ADMIN — OXYCODONE HYDROCHLORIDE 10 MILLIGRAM(S): 30 TABLET ORAL at 13:43

## 2024-11-05 RX ADMIN — Medication 40 MILLIGRAM(S): at 05:17

## 2024-11-05 RX ADMIN — SODIUM PHOSPHATE, MONOBASIC, MONOHYDRATE AND SODIUM PHOSPHATE, DIBASIC ANHYDROUS 63.75 MILLIMOLE(S): 276; 142 INJECTION, SOLUTION INTRAVENOUS at 12:36

## 2024-11-05 RX ADMIN — Medication 1 TABLET(S): at 05:17

## 2024-11-05 RX ADMIN — Medication 650 MILLIGRAM(S): at 13:30

## 2024-11-05 NOTE — PROGRESS NOTE ADULT - PROVIDER SPECIALTY LIST ADULT
Endocrinology
Trauma Surgery
Internal Medicine
Surgery
Trauma Surgery
Trauma Surgery
Internal Medicine
Surgery
Hepatology
Surgery
Endocrinology
Endocrinology
Internal Medicine
Hospitalist
Internal Medicine

## 2024-11-05 NOTE — PROGRESS NOTE ADULT - ATTENDING COMMENTS
38M, healthy, overweight BMI 26, adm. to [facility] on 10/28/24 with acute abdominal pain after reported intake of 5+ drinks on the week-end.    - MRCP: no gallstones, hence no gallstone pancreatitis  - mild alc pancreatitis with concern for ventral tail necrosis on MRI 10/31/24, no pain, tachycardia  bpm  - mild alc hepatitis with trace ascites: LFTs 1.4/148, 62/69, last INR 1.12 (11/02) - mild alc hepatitis, improved bilirubin from max. 4.3    - rising leukocytosis, WBC 18.8 - likely due to hepatitis  - AUD: today reports 3 large 8% drinks per day for 10 days. Less before that. He feels strongly he can stop without RPP and medication, but was willing to take information and a prescription for acamprosate    Plan:  - monitor pain, continue fluid intake  - monitor CBC and LFTs  - can discharge home soon if stable; with acamprosate 666 mg TID, recommend RPP at [facility].
ATTENDING ATTESTATION  I have seen and examined this patient on rounds thismorning with the surgery team. I have reviewed all new labs, imaging and reports. I have participated in formulating the plan for the day, and have read and agree with the history, ROS, exam, assessment and plan as stated above.     Feeling much improved. Eating well yesterday.   Had 24 hours of oral calcium supplementation, iCa stable today.   WBC remains elevated, but afebrile and likely inflammatory in nature from the pancreatitis.   We discussed that he is ready for discharge home with close follow up (PMD or ACS) within 1 week with repeat CBC and iCa prior to the office visit.     Total time spent in the care of this patient today (excluding critical care, teaching & procedures): 15 min                 Over 50% of the total time was spent in discussion and coordination of care with consulting services, dietary and rehab services.     Gabriella Ovalle M.D., M.S.  Division of Acute Care Surgery
Pt seen and examined with ACS team, agree with above.     Pancreatitis, etiology could be alcohol, gallstone, or hypertriglyceridemia. Pancreatitis is improving, pt is feeling better, tolerating diet. Given that tbili remains elevated (labs reviewed), have ordered MRCP. Possible alcohol withdrawal, s/p phenobarb taper. JENNY has resolved. Have recommended cholecystectomy in the future, after resolution of pancreatic inflammation. Hypocalcemia has improved, hyponatremia improving. Hypokalemia- continue to replete.
38-year-old man, with no significant medical history, presents with pancreatitis and hypertriglyceridemia.  Started on medical treatment with fenofibrate 145 mg daily and Lovaza 2 g twice daily.  Educated patient on alcohol cessation.  Repeat calcium and magnesium as per primary team.  Referral for for lipids clinic provided for patient.
38-year-old man, with no significant medical history, presents with pancreatitis and hypertriglyceridemia.  Started on medical treatment with fenofibrate 145 mg daily and Lovaza 2 g twice daily.  Educated patient on alcohol cessation.  Repeat calcium and magnesium as per primary team.  Referral for for lipids clinic provided for patient.

## 2024-11-05 NOTE — PROGRESS NOTE ADULT - ASSESSMENT
Assessment  38 y M with no significant pmh, presenting with acute onset abdominal pain that woke him up this morning, No associated nausea, vomiting, fevers or chills. Patient reports having 5 drink on the weekend, In the ED, patient was afebrile, VSS, WBC 10. Na 129, T bili 2.4, Alk phos 172, , , lipase 1122, lactate 5.2. CT showed gallbladder sludge, normal CBD, acute interstitial edematous pancreatitis.  Admitted to surgery service for gallstone pancreatitis.     Plan:  - f/u AM labs - replete lytes as needed  - bili decreasing so no further hepatology workup     - no plan for steroids as bili improving  - Diet: Reg, tolerating  - Phenobarb taper completed  - Pain control PRN   - Monitor UOP  - supportive underwear for scrotal edema   - dispo: planning for today    Surgery [id number]

## 2024-11-05 NOTE — PROGRESS NOTE ADULT - SUBJECTIVE AND OBJECTIVE BOX
SURGERY DAILY PROGRESS NOTE    SUBJECTIVE: Pt seen and examined at bedside. Admits pain improving, walking a lot. Denies chest pain, SOB, palpitations, HA, fever, chills, N/V.      OBJECTIVE:  Vital Signs Last 24 Hrs  T(C): 37.1 (05 Nov 2024 05:36), Max: 37.2 (05 Nov 2024 00:24)  T(F): 98.8 (05 Nov 2024 05:36), Max: 98.9 (05 Nov 2024 00:24)  HR: 105 (05 Nov 2024 05:36) (86 - 107)  BP: 134/88 (05 Nov 2024 05:36) (134/88 - 149/97)  BP(mean): --  RR: 18 (05 Nov 2024 05:36) (18 - 18)  SpO2: 96% (05 Nov 2024 05:36) (96% - 99%)    Parameters below as of 05 Nov 2024 05:36  Patient On (Oxygen Delivery Method): room air        I&O's Summary    04 Nov 2024 07:01  -  05 Nov 2024 07:00  --------------------------------------------------------  IN: 630 mL / OUT: 0 mL / NET: 630 mL          PHYSICAL EXAM:  GEN: resting in bed comfortably in NAD  RESP: no increased WOB, no tachypnea, on RA  ABD: soft, non-distended abdomen. Mild tenderness to palpation in epigastrium. No rebound, guarding or rigidity.   Groin: Scrotum edematous but improving, no skin changes or warmth.   EXTR: warm, well-perfused without gross deformities; spontaneous movement in b/l U/L extrem  NEURO: awake, alert      LABS:                        8.5    18.84 )-----------( 433      ( 04 Nov 2024 07:17 )             24.7     11-04    131[L]  |  95[L]  |  8   ----------------------------<  106[H]  3.0[L]   |  25  |  0.49[L]    Ca    7.8[L]      04 Nov 2024 07:17  Phos  2.3     11-04  Mg     1.9     11-04    TPro  5.8[L]  /  Alb  2.6[L]  /  TBili  1.4[H]  /  DBili  0.8[H]  /  AST  62[H]  /  ALT  69[H]  /  AlkPhos  148[H]  11-04      Urinalysis Basic - ( 04 Nov 2024 07:17 )    Color: x / Appearance: x / SG: x / pH: x  Gluc: 106 mg/dL / Ketone: x  / Bili: x / Urobili: x   Blood: x / Protein: x / Nitrite: x   Leuk Esterase: x / RBC: x / WBC x   Sq Epi: x / Non Sq Epi: x / Bacteria: x        RADIOLOGY & ADDITIONAL STUDIES:

## 2024-11-05 NOTE — DISCHARGE NOTE NURSING/CASE MANAGEMENT/SOCIAL WORK - FINANCIAL ASSISTANCE
[facility] provides services at a reduced cost to those who are determined to be eligible through [facility]’s financial assistance program. Information regarding [facility]’s financial assistance program can be found by going to [email or web address] or by calling [phone].

## 2024-11-05 NOTE — DISCHARGE NOTE NURSING/CASE MANAGEMENT/SOCIAL WORK - NSDCFUADDAPPT_GEN_ALL_CORE_FT
Patient will need follow up with [facility] lipid clinic - Dr. Angel Knox [phone] [address] [facility] [address], [address], [place] [address]   - please call for appt    Please follow up with your primary care provider 1 week after discharge. The primary care provider should obtain lab work including CBC, Ionized Calcium, and procalcitonin.

## 2024-11-05 NOTE — PROGRESS NOTE ADULT - REASON FOR ADMISSION
Gallstone pancreatitis

## 2024-11-05 NOTE — DISCHARGE NOTE NURSING/CASE MANAGEMENT/SOCIAL WORK - PATIENT PORTAL LINK FT
You can access the FollowMyHealth Patient Portal offered by [facility] by registering at the following website: [email or web address]. By joining [employer]’s FollowMyHealth portal, you will also be able to view your health information using other applications (apps) compatible with our system.

## 2024-11-05 NOTE — DISCHARGE NOTE NURSING/CASE MANAGEMENT/SOCIAL WORK - NSDCPEFALRISK_GEN_ALL_CORE
For information on Fall & Injury Prevention, visit: [email or web address] OR  [email or web address] OR  [email or web address]://www.cdc.gov/steadi/patient.html

## 2024-11-08 LAB
CULTURE RESULTS: SIGNIFICANT CHANGE UP
CULTURE RESULTS: SIGNIFICANT CHANGE UP
SPECIMEN SOURCE: SIGNIFICANT CHANGE UP
SPECIMEN SOURCE: SIGNIFICANT CHANGE UP

## 2024-11-18 ENCOUNTER — APPOINTMENT (OUTPATIENT)
Dept: INTERNAL MEDICINE | Facility: CLINIC | Age: 39
End: 2024-11-18

## 2024-11-18 VITALS
SYSTOLIC BLOOD PRESSURE: 110 MMHG | DIASTOLIC BLOOD PRESSURE: 79 MMHG | WEIGHT: 158 LBS | HEART RATE: 118 BPM | OXYGEN SATURATION: 99 % | RESPIRATION RATE: 15 BRPM | TEMPERATURE: 98.1 F

## 2024-11-18 DIAGNOSIS — Z87.898 PERSONAL HISTORY OF OTHER SPECIFIED CONDITIONS: ICD-10-CM

## 2024-11-18 DIAGNOSIS — Z78.9 OTHER SPECIFIED HEALTH STATUS: ICD-10-CM

## 2024-11-18 PROCEDURE — 99204 OFFICE O/P NEW MOD 45 MIN: CPT

## 2024-11-18 PROCEDURE — 99214 OFFICE O/P EST MOD 30 MIN: CPT

## 2024-11-18 PROCEDURE — G2211 COMPLEX E/M VISIT ADD ON: CPT | Mod: NC

## 2024-11-18 PROCEDURE — 36415 COLL VENOUS BLD VENIPUNCTURE: CPT

## 2024-11-19 ENCOUNTER — APPOINTMENT (OUTPATIENT)
Dept: CARDIOLOGY | Facility: CLINIC | Age: 39
End: 2024-11-19
Payer: COMMERCIAL

## 2024-11-19 LAB
ALBUMIN SERPL ELPH-MCNC: 3.6 G/DL
ALP BLD-CCNC: 85 U/L
ALT SERPL-CCNC: 37 U/L
ANION GAP SERPL CALC-SCNC: 13 MMOL/L
AST SERPL-CCNC: 39 U/L
BASOPHILS # BLD AUTO: 0.14 K/UL
BASOPHILS NFR BLD AUTO: 1.1 %
BILIRUB SERPL-MCNC: 0.4 MG/DL
BUN SERPL-MCNC: 5 MG/DL
CALCIUM SERPL-MCNC: 9.6 MG/DL
CHLORIDE SERPL-SCNC: 99 MMOL/L
CHOLEST SERPL-MCNC: 158 MG/DL
CO2 SERPL-SCNC: 24 MMOL/L
CREAT SERPL-MCNC: 0.64 MG/DL
EGFR: 124 ML/MIN/1.73M2
EOSINOPHIL # BLD AUTO: 0.41 K/UL
EOSINOPHIL NFR BLD AUTO: 3.1 %
GLUCOSE SERPL-MCNC: 91 MG/DL
HCT VFR BLD CALC: 34 %
HDLC SERPL-MCNC: 39 MG/DL
HGB BLD-MCNC: 10.2 G/DL
IMM GRANULOCYTES NFR BLD AUTO: 0.8 %
LDLC SERPL CALC-MCNC: 92 MG/DL
LYMPHOCYTES # BLD AUTO: 3.86 K/UL
LYMPHOCYTES NFR BLD AUTO: 29.2 %
MAN DIFF?: NORMAL
MCHC RBC-ENTMCNC: 26.8 PG
MCHC RBC-ENTMCNC: 30 G/DL
MCV RBC AUTO: 89.2 FL
MONOCYTES # BLD AUTO: 0.96 K/UL
MONOCYTES NFR BLD AUTO: 7.3 %
NEUTROPHILS # BLD AUTO: 7.73 K/UL
NEUTROPHILS NFR BLD AUTO: 58.5 %
NONHDLC SERPL-MCNC: 119 MG/DL
PLATELET # BLD AUTO: 836 K/UL
POTASSIUM SERPL-SCNC: 4.4 MMOL/L
PROCALCITONIN SERPL-MCNC: 0.09 NG/ML
PROT SERPL-MCNC: 7.1 G/DL
RBC # BLD: 3.81 M/UL
RBC # FLD: 15.3 %
SODIUM SERPL-SCNC: 137 MMOL/L
TRIGL SERPL-MCNC: 157 MG/DL
WBC # FLD AUTO: 13.21 K/UL

## 2024-11-20 PROBLEM — E78.1 HYPERTRIGLYCERIDEMIA: Status: ACTIVE | Noted: 2024-11-18

## 2024-11-20 LAB — CA-I SERPL-SCNC: 5.4 MG/DL

## 2024-11-21 ENCOUNTER — NON-APPOINTMENT (OUTPATIENT)
Age: 39
End: 2024-11-21

## 2024-11-21 ENCOUNTER — APPOINTMENT (OUTPATIENT)
Dept: CARDIOLOGY | Facility: CLINIC | Age: 39
End: 2024-11-21
Payer: COMMERCIAL

## 2024-11-21 VITALS
HEART RATE: 101 BPM | BODY MASS INDEX: 24.48 KG/M2 | OXYGEN SATURATION: 99 % | HEIGHT: 67 IN | WEIGHT: 156 LBS | SYSTOLIC BLOOD PRESSURE: 110 MMHG | DIASTOLIC BLOOD PRESSURE: 80 MMHG

## 2024-11-21 DIAGNOSIS — E78.1 PURE HYPERGLYCERIDEMIA: ICD-10-CM

## 2024-11-21 DIAGNOSIS — K85.90 ACUTE PANCREATITIS WITHOUT NECROSIS OR INFECTION, UNSPECIFIED: ICD-10-CM

## 2024-11-21 DIAGNOSIS — Z00.00 ENCOUNTER FOR GENERAL ADULT MEDICAL EXAMINATION W/OUT ABNORMAL FINDINGS: ICD-10-CM

## 2024-11-21 PROCEDURE — 99401 PREV MED CNSL INDIV APPRX 15: CPT

## 2024-11-21 PROCEDURE — 93000 ELECTROCARDIOGRAM COMPLETE: CPT

## 2024-11-21 PROCEDURE — 99205 OFFICE O/P NEW HI 60 MIN: CPT | Mod: 25

## 2024-11-26 ENCOUNTER — APPOINTMENT (OUTPATIENT)
Dept: HEPATOLOGY | Facility: CLINIC | Age: 39
End: 2024-11-26
Payer: COMMERCIAL

## 2024-11-26 VITALS
OXYGEN SATURATION: 98 % | WEIGHT: 158 LBS | BODY MASS INDEX: 24.8 KG/M2 | DIASTOLIC BLOOD PRESSURE: 86 MMHG | HEART RATE: 113 BPM | SYSTOLIC BLOOD PRESSURE: 135 MMHG | TEMPERATURE: 97.8 F | HEIGHT: 67 IN

## 2024-11-26 DIAGNOSIS — K76.0 FATTY (CHANGE OF) LIVER, NOT ELSEWHERE CLASSIFIED: ICD-10-CM

## 2024-11-26 PROCEDURE — 99203 OFFICE O/P NEW LOW 30 MIN: CPT

## 2024-11-27 LAB
HCV AB SER QL: REACTIVE
HCV S/CO RATIO: 1.76 S/CO

## 2024-11-29 LAB
HCV RNA FLD QL NAA+PROBE: NORMAL
HCV RNA SPEC QL PROBE+SIG AMP: NOT DETECTED

## 2024-12-02 ENCOUNTER — NON-APPOINTMENT (OUTPATIENT)
Age: 39
End: 2024-12-02

## 2024-12-06 RX ORDER — FENOFIBRATE 145 MG/1
145 TABLET, COATED ORAL
Qty: 90 | Refills: 1 | Status: ACTIVE | COMMUNITY
Start: 2024-12-05 | End: 1900-01-01

## 2024-12-06 RX ORDER — OMEGA-3-ACID ETHYL ESTERS CAPSULES 1 G/1
1 CAPSULE, LIQUID FILLED ORAL
Qty: 360 | Refills: 1 | Status: ACTIVE | COMMUNITY
Start: 2024-12-05 | End: 1900-01-01

## 2025-02-04 ENCOUNTER — APPOINTMENT (OUTPATIENT)
Dept: INTERNAL MEDICINE | Facility: CLINIC | Age: 40
End: 2025-02-04
Payer: COMMERCIAL

## 2025-02-04 VITALS
RESPIRATION RATE: 18 BRPM | SYSTOLIC BLOOD PRESSURE: 118 MMHG | WEIGHT: 170 LBS | HEART RATE: 99 BPM | TEMPERATURE: 98.6 F | DIASTOLIC BLOOD PRESSURE: 80 MMHG | BODY MASS INDEX: 26.68 KG/M2 | OXYGEN SATURATION: 97 % | HEIGHT: 67 IN

## 2025-02-04 PROCEDURE — 36415 COLL VENOUS BLD VENIPUNCTURE: CPT

## 2025-02-04 PROCEDURE — 99204 OFFICE O/P NEW MOD 45 MIN: CPT

## 2025-02-04 PROCEDURE — 99214 OFFICE O/P EST MOD 30 MIN: CPT

## 2025-02-04 PROCEDURE — G2211 COMPLEX E/M VISIT ADD ON: CPT | Mod: NC

## 2025-02-13 ENCOUNTER — APPOINTMENT (OUTPATIENT)
Dept: HEPATOLOGY | Facility: CLINIC | Age: 40
End: 2025-02-13
Payer: COMMERCIAL

## 2025-02-13 VITALS
TEMPERATURE: 97.6 F | OXYGEN SATURATION: 100 % | HEART RATE: 91 BPM | WEIGHT: 176 LBS | DIASTOLIC BLOOD PRESSURE: 83 MMHG | BODY MASS INDEX: 27.62 KG/M2 | HEIGHT: 67 IN | SYSTOLIC BLOOD PRESSURE: 130 MMHG

## 2025-02-13 DIAGNOSIS — K76.0 FATTY (CHANGE OF) LIVER, NOT ELSEWHERE CLASSIFIED: ICD-10-CM

## 2025-02-13 DIAGNOSIS — F10.90 ALCOHOL USE, UNSPECIFIED, UNCOMPLICATED: ICD-10-CM

## 2025-02-13 LAB
ALBUMIN SERPL ELPH-MCNC: 4.6 G/DL
ALP BLD-CCNC: 32 U/L
ALT SERPL-CCNC: 18 U/L
AMYLASE/CREAT SERPL: 80 U/L
ANION GAP SERPL CALC-SCNC: 12 MMOL/L
AST SERPL-CCNC: 22 U/L
BILIRUB SERPL-MCNC: 0.3 MG/DL
BUN SERPL-MCNC: 14 MG/DL
CALCIUM SERPL-MCNC: 9.9 MG/DL
CHLORIDE SERPL-SCNC: 101 MMOL/L
CHOLEST SERPL-MCNC: 162 MG/DL
CO2 SERPL-SCNC: 26 MMOL/L
CREAT SERPL-MCNC: 0.89 MG/DL
EGFR: 112 ML/MIN/1.73M2
GLUCOSE SERPL-MCNC: 91 MG/DL
HDLC SERPL-MCNC: 57 MG/DL
LDLC SERPL CALC-MCNC: 92 MG/DL
LPL SERPL-CCNC: 11 U/L
NONHDLC SERPL-MCNC: 105 MG/DL
POTASSIUM SERPL-SCNC: 4.4 MMOL/L
PROT SERPL-MCNC: 7.9 G/DL
SODIUM SERPL-SCNC: 138 MMOL/L
TRIGL SERPL-MCNC: 64 MG/DL

## 2025-02-13 PROCEDURE — 76981 USE PARENCHYMA: CPT

## 2025-02-13 PROCEDURE — 99213 OFFICE O/P EST LOW 20 MIN: CPT

## 2025-02-14 PROBLEM — F10.90 ALCOHOL USE: Status: ACTIVE | Noted: 2025-02-14

## 2025-02-24 DIAGNOSIS — R07.9 CHEST PAIN, UNSPECIFIED: ICD-10-CM

## 2025-03-18 ENCOUNTER — APPOINTMENT (OUTPATIENT)
Dept: CARDIOLOGY | Facility: CLINIC | Age: 40
End: 2025-03-18
Payer: COMMERCIAL

## 2025-03-18 ENCOUNTER — NON-APPOINTMENT (OUTPATIENT)
Age: 40
End: 2025-03-18

## 2025-03-18 VITALS
OXYGEN SATURATION: 97 % | BODY MASS INDEX: 27.88 KG/M2 | SYSTOLIC BLOOD PRESSURE: 120 MMHG | WEIGHT: 178 LBS | DIASTOLIC BLOOD PRESSURE: 72 MMHG | HEART RATE: 83 BPM

## 2025-03-18 DIAGNOSIS — R07.9 CHEST PAIN, UNSPECIFIED: ICD-10-CM

## 2025-03-18 DIAGNOSIS — Z00.00 ENCOUNTER FOR GENERAL ADULT MEDICAL EXAMINATION W/OUT ABNORMAL FINDINGS: ICD-10-CM

## 2025-03-18 DIAGNOSIS — E78.1 PURE HYPERGLYCERIDEMIA: ICD-10-CM

## 2025-03-18 PROCEDURE — 93306 TTE W/DOPPLER COMPLETE: CPT

## 2025-03-18 PROCEDURE — 93000 ELECTROCARDIOGRAM COMPLETE: CPT

## 2025-03-18 PROCEDURE — G0537: CPT

## 2025-03-18 PROCEDURE — 99214 OFFICE O/P EST MOD 30 MIN: CPT | Mod: 25

## 2025-03-18 PROCEDURE — 99401 PREV MED CNSL INDIV APPRX 15: CPT

## 2025-05-02 ENCOUNTER — LABORATORY RESULT (OUTPATIENT)
Age: 40
End: 2025-05-02

## 2025-05-02 ENCOUNTER — APPOINTMENT (OUTPATIENT)
Dept: INTERNAL MEDICINE | Facility: CLINIC | Age: 40
End: 2025-05-02
Payer: COMMERCIAL

## 2025-05-02 ENCOUNTER — TRANSCRIPTION ENCOUNTER (OUTPATIENT)
Age: 40
End: 2025-05-02

## 2025-05-02 VITALS
WEIGHT: 181 LBS | OXYGEN SATURATION: 98 % | BODY MASS INDEX: 29.09 KG/M2 | HEART RATE: 93 BPM | TEMPERATURE: 98.9 F | DIASTOLIC BLOOD PRESSURE: 80 MMHG | HEIGHT: 66 IN | SYSTOLIC BLOOD PRESSURE: 106 MMHG

## 2025-05-02 DIAGNOSIS — Z87.19 PERSONAL HISTORY OF OTHER DISEASES OF THE DIGESTIVE SYSTEM: ICD-10-CM

## 2025-05-02 DIAGNOSIS — E78.1 PURE HYPERGLYCERIDEMIA: ICD-10-CM

## 2025-05-02 DIAGNOSIS — Z00.00 ENCOUNTER FOR GENERAL ADULT MEDICAL EXAMINATION W/OUT ABNORMAL FINDINGS: ICD-10-CM

## 2025-05-02 DIAGNOSIS — R76.8 OTHER SPECIFIED ABNORMAL IMMUNOLOGICAL FINDINGS IN SERUM: ICD-10-CM

## 2025-05-02 PROCEDURE — 36415 COLL VENOUS BLD VENIPUNCTURE: CPT

## 2025-05-02 PROCEDURE — 99395 PREV VISIT EST AGE 18-39: CPT

## 2025-05-02 RX ORDER — ASCORBATE CALCIUM 500 MG
TABLET ORAL
Refills: 0 | Status: ACTIVE | COMMUNITY

## 2025-05-03 LAB
25(OH)D3 SERPL-MCNC: 26.9 NG/ML
ALBUMIN SERPL ELPH-MCNC: 4.7 G/DL
ALP BLD-CCNC: 30 U/L
ALT SERPL-CCNC: 18 U/L
ANION GAP SERPL CALC-SCNC: 14 MMOL/L
APPEARANCE: CLEAR
AST SERPL-CCNC: 22 U/L
BACTERIA: NEGATIVE /HPF
BASOPHILS # BLD AUTO: 0.06 K/UL
BASOPHILS NFR BLD AUTO: 0.5 %
BILIRUB SERPL-MCNC: 0.5 MG/DL
BILIRUBIN URINE: NEGATIVE
BLOOD URINE: NEGATIVE
BUN SERPL-MCNC: 16 MG/DL
CALCIUM SERPL-MCNC: 9.5 MG/DL
CAST: 0 /LPF
CHLORIDE SERPL-SCNC: 101 MMOL/L
CHOLEST SERPL-MCNC: 160 MG/DL
CO2 SERPL-SCNC: 21 MMOL/L
COLOR: YELLOW
CREAT SERPL-MCNC: 0.94 MG/DL
EGFRCR SERPLBLD CKD-EPI 2021: 106 ML/MIN/1.73M2
EOSINOPHIL # BLD AUTO: 0.18 K/UL
EOSINOPHIL NFR BLD AUTO: 1.5 %
EPITHELIAL CELLS: 0 /HPF
ESTIMATED AVERAGE GLUCOSE: 157 MG/DL
GLUCOSE QUALITATIVE U: NEGATIVE MG/DL
GLUCOSE SERPL-MCNC: 94 MG/DL
HBA1C MFR BLD HPLC: 7.1 %
HCT VFR BLD CALC: 40 %
HCV RNA FLD QL NAA+PROBE: NORMAL
HCV RNA SPEC QL PROBE+SIG AMP: NOT DETECTED
HDLC SERPL-MCNC: 51 MG/DL
HGB BLD-MCNC: 12.7 G/DL
HIV1+2 AB SPEC QL IA.RAPID: NONREACTIVE
IMM GRANULOCYTES NFR BLD AUTO: 0.3 %
KETONES URINE: NEGATIVE MG/DL
LDLC SERPL-MCNC: 96 MG/DL
LEUKOCYTE ESTERASE URINE: NEGATIVE
LYMPHOCYTES # BLD AUTO: 5.17 K/UL
LYMPHOCYTES NFR BLD AUTO: 44.5 %
MAN DIFF?: NORMAL
MCHC RBC-ENTMCNC: 23.6 PG
MCHC RBC-ENTMCNC: 31.8 G/DL
MCV RBC AUTO: 74.3 FL
MICROSCOPIC-UA: NORMAL
MONOCYTES # BLD AUTO: 0.82 K/UL
MONOCYTES NFR BLD AUTO: 7.1 %
NEUTROPHILS # BLD AUTO: 5.35 K/UL
NEUTROPHILS NFR BLD AUTO: 46.1 %
NITRITE URINE: NEGATIVE
NONHDLC SERPL-MCNC: 110 MG/DL
PH URINE: 6
PLATELET # BLD AUTO: 333 K/UL
POTASSIUM SERPL-SCNC: 4.2 MMOL/L
PROT SERPL-MCNC: 7.6 G/DL
PROTEIN URINE: NEGATIVE MG/DL
RBC # BLD: 5.38 M/UL
RBC # FLD: 15.4 %
RED BLOOD CELLS URINE: 2 /HPF
SODIUM SERPL-SCNC: 136 MMOL/L
SPECIFIC GRAVITY URINE: 1.03
T4 FREE SERPL-MCNC: 1.4 NG/DL
TRIGL SERPL-MCNC: 68 MG/DL
TSH SERPL-ACNC: 1.54 UIU/ML
UROBILINOGEN URINE: 0.2 MG/DL
VIT B12 SERPL-MCNC: 479 PG/ML
WBC # FLD AUTO: 11.62 K/UL
WHITE BLOOD CELLS URINE: 0 /HPF

## 2025-09-02 ENCOUNTER — RX RENEWAL (OUTPATIENT)
Age: 40
End: 2025-09-02